# Patient Record
Sex: MALE | Race: WHITE | Employment: FULL TIME | URBAN - METROPOLITAN AREA
[De-identification: names, ages, dates, MRNs, and addresses within clinical notes are randomized per-mention and may not be internally consistent; named-entity substitution may affect disease eponyms.]

---

## 2017-06-15 NOTE — PROGRESS NOTES
SUBJECTIVE:     CC: Harvey Pro is an 31 year old male who presents for preventative health visit.     Healthy Habits:Answers for HPI/ROS submitted by the patient on 6/14/2017   Annual Exam:  Getting at least 3 servings of Calcium per day:: Yes  Bi-annual eye exam:: Yes  Dental care twice a year:: NO - no dental problems - but just made dental apt  Sleep apnea or symptoms of sleep apnea:: Daytime drowsiness - midday. Probably b/c not sleeping enough. Power nap at home after work and cold brew coffee helps. Only about 6.5 hours of sleep per night. 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  Diet:: Other  Frequency of exercise:: 4-5 days/week - weight lifting and some running  Taking medications regularly:: Not Applicable  Medication side effects:: Not applicable  Additional concerns today:: YES  PHQ-2 Score: 0  Duration of exercise:: 45-60 minutes    Harvey is doing really well  Working as Bearch at [employer]  Was up north in [place] with his girlfriend last weekend  Had some tendonitis R elbow from weight lifting that has since resolved.  Recurrent tinea versicolor - would like refill of ketoconazole shampoo that has worked well in the past.   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 He was told it could get worse but now is just a nuisance and he is willing to live with it as he currently rarely gets vertigo and doesn't want to have surgery if doesn't need it. He is relieved it isn't anything serious.    Today's PHQ-2 Score:   PHQ-2 ( 1999 Pf[employer]) 6/14/2017 11/30/2015   Q1: Little interest or pleasure in doing things 0 0   Q2: Feeling down, depressed or hopeless 0 0   PHQ-2 Score 0 0   Q1: Little interest or pleasure in doing things Not at all -   Q2: Feeling down, depressed or hopeless Not at all -   PHQ-2 Score 0 -       Abuse: Current or Past(Physical, Sexual or Emotional)- No  Do you feel safe in  "your environment - Yes    Social History   Substance Use Topics     Smoking status: Never Smoker     Smokeless tobacco: Never Used     Alcohol use 0.0 oz/week      Comment: Weekends     The patient does not drink >3 drinks per day nor >7 drinks per week.    Last PSA: No results found for: PSA    No results for input(s): CHOL, HDL, LDL, TRIG, CHOLHDLRATIO, NHDL in the last 90273 hours.    Reviewed orders with patient. Reviewed health maintenance and updated orders accordingly - Yes    ROS:  Comprehensive ROS negative unless as stated above in HPI.      OBJECTIVE:     /72 (BP Location: Right arm, Patient Position: Chair, Cuff Size: Adult Regular)  Pulse 75  Temp 98  F (36.7  C) (Oral)  Resp 16  Ht 5' 11.5\" (1.816 m)  Wt 166 lb (75.3 kg)  SpO2 99%  BMI 22.83 kg/m2  EXAM:  GENERAL: healthy, alert and no distress  EYES: Eyes grossly normal to inspection, PERRL and conjunctivae and sclerae normal  HENT: ear canals and TM's normal, nose and mouth without ulcers or lesions  NECK: no adenopathy, no asymmetry, masses, or scars and thyroid normal to palpation  RESP: lungs clear to auscultation - no rales, rhonchi or wheezes  CV: regular rate and rhythm, normal S1 S2, no S3 or S4, no murmur, click or rub, no peripheral edema   ABDOMEN: soft, nontender, no hepatosplenomegaly, no masses and bowel sounds normal  MS: no gross musculoskeletal defects noted, no edema  SKIN: faint varying pigmentation along left flank  NEURO: Normal strength and tone, mentation intact and speech normal  PSYCH: mentation appears normal, affect normal/bright    ASSESSMENT/PLAN:     1. Encounter for preventative adult health care exam with abnormal findings  Healthy alexandre  Declines fasting labs which is reasonable for his age  Will be going to dentist and also working on getting more sleep    2. Superior semicircular canal dehiscence, unspecified laterality  Monitoring    3. Tinea versicolor  - ketoconazole (NIZORAL) 2 % shampoo; The shampoo " "is applied to affected areas and is washed off after five minutes.  Dispense: 120 mL; Refill: 3    COUNSELING:  Reviewed preventive health counseling, as reflected in patient instructions       Regular exercise       Healthy diet/nutrition   reports that he has never smoked. He has never used smokeless tobacco.  Estimated body mass index is 22.83 kg/(m^2) as calculated from the following:    Height as of this encounter: 5' 11.5\" (1.816 m).    Weight as of this encounter: 166 lb (75.3 kg).       Patient Instructions   Let me know if your tinea isn't improving  Follow up as needed        Merly Ellis, DO  [facility]  "

## 2017-06-15 NOTE — PATIENT INSTRUCTIONS
Let me know if your tinea isn't improving  Follow up as needed    Preventive Health Recommendations  Male Ages 26 - 39    Yearly exam:             See your health care provider every year in order to  o   Review health changes.   o   Discuss preventive care.    o   Review your medicines if your doctor has prescribed any.    You should be tested each year for STDs (sexually transmitted diseases), if you re at risk.     After age 35, talk to your provider about cholesterol testing. If you are at risk for heart disease, have your cholesterol tested at least every 5 years.     If you are at risk for diabetes, you should have a diabetes test (fasting glucose).  Shots: Get a flu shot each year. Get a tetanus shot every 10 years.     Nutrition:    Eat at least 5 servings of fruits and vegetables daily.     Eat whole-grain bread, whole-wheat pasta and brown rice instead of white grains and rice.     Talk to your provider about Calcium and Vitamin D.     Lifestyle    Exercise for at least 150 minutes a week (30 minutes a day, 5 days a week). This will help you control your weight and prevent disease.     Limit alcohol to one drink per day.     No smoking.     Wear sunscreen to prevent skin cancer.     See your dentist every six months for an exam and cleaning.

## 2017-06-16 ENCOUNTER — OFFICE VISIT (OUTPATIENT)
Dept: FAMILY MEDICINE | Facility: CLINIC | Age: 32
End: 2017-06-16
Payer: COMMERCIAL

## 2017-06-16 VITALS
DIASTOLIC BLOOD PRESSURE: 72 MMHG | SYSTOLIC BLOOD PRESSURE: 124 MMHG | WEIGHT: 166 LBS | HEIGHT: 72 IN | HEART RATE: 75 BPM | BODY MASS INDEX: 22.48 KG/M2 | RESPIRATION RATE: 16 BRPM | OXYGEN SATURATION: 99 % | TEMPERATURE: 98 F

## 2017-06-16 DIAGNOSIS — H83.8X9 SUPERIOR SEMICIRCULAR CANAL DEHISCENCE, UNSPECIFIED LATERALITY: ICD-10-CM

## 2017-06-16 DIAGNOSIS — Z00.01 ENCOUNTER FOR PREVENTATIVE ADULT HEALTH CARE EXAM WITH ABNORMAL FINDINGS: Primary | ICD-10-CM

## 2017-06-16 DIAGNOSIS — B36.0 TINEA VERSICOLOR: ICD-10-CM

## 2017-06-16 PROCEDURE — 99395 PREV VISIT EST AGE 18-39: CPT | Performed by: INTERNAL MEDICINE

## 2017-06-16 RX ORDER — KETOCONAZOLE 20 MG/ML
SHAMPOO TOPICAL
Qty: 120 ML | Refills: 3 | Status: SHIPPED | OUTPATIENT
Start: 2017-06-16 | End: 2022-04-13

## 2017-06-16 NOTE — NURSING NOTE
"Chief Complaint   Patient presents with     Physical     Not Fasting     Elbow Pain     Right,  persisstant        Initial /72 (BP Location: Right arm, Patient Position: Chair, Cuff Size: Adult Regular)  Pulse 75  Temp 98  F (36.7  C) (Oral)  Resp 16  Ht 5' 11.5\" (1.816 m)  Wt 166 lb (75.3 kg)  SpO2 99%  BMI 22.83 kg/m2 Estimated body mass index is 22.83 kg/(m^2) as calculated from the following:    Height as of this encounter: 5' 11.5\" (1.816 m).    Weight as of this encounter: 166 lb (75.3 kg).  Medication Reconciliation: complete   Kamala Chong CMA (AAMA)      "

## 2017-06-16 NOTE — MR AVS SNAPSHOT
After Visit Summary   6/16/2017    Harvey Pro    MRN: [id number]           Patient Information     Date Of Birth          1985        Visit Information        Provider Department      6/16/2017 12:30 PM Merly Ellis,  [facility]        Today's Diagnoses     Encounter for preventative adult health care exam with abnormal findings    -  1    Superior semicircular canal dehiscence, unspecified laterality        Tinea versicolor          Care Instructions    Let me know if your tinea isn't improving  Follow up as needed    Preventive Health Recommendations  Male Ages 26 - 39    Yearly exam:             See your health care provider every year in order to  o   Review health changes.   o   Discuss preventive care.    o   Review your medicines if your doctor has prescribed any.    You should be tested each year for STDs (sexually transmitted diseases), if you re at risk.     After age 35, talk to your provider about cholesterol testing. If you are at risk for heart disease, have your cholesterol tested at least every 5 years.     If you are at risk for diabetes, you should have a diabetes test (fasting glucose).  Shots: Get a flu shot each year. Get a tetanus shot every 10 years.     Nutrition:    Eat at least 5 servings of fruits and vegetables daily.     Eat whole-grain bread, whole-wheat pasta and brown rice instead of white grains and rice.     Talk to your provider about Calcium and Vitamin D.     Lifestyle    Exercise for at least 150 minutes a week (30 minutes a day, 5 days a week). This will help you control your weight and prevent disease.     Limit alcohol to one drink per day.     No smoking.     Wear sunscreen to prevent skin cancer.     See your dentist every six months for an exam and cleaning.             Follow-ups after your visit        Who to contact     If you have questions or need follow up information about today's clinic visit or your schedule please  "contact [facility] directly at [phone].  Normal or non-critical lab and imaging results will be communicated to you by MyChart, letter or phone within 4 business days after the clinic has received the results. If you do not hear from us within 7 days, please contact the clinic through Oxagenhart or phone. If you have a critical or abnormal lab result, we will notify you by phone as soon as possible.  Submit refill requests through KarmaHire or call your pharmacy and they will forward the refill request to us. Please allow 3 business days for your refill to be completed.          Additional Information About Your Visit        OxagenharDroneDeploy Information     KarmaHire gives you secure access to your electronic health record. If you see a primary care provider, you can also send messages to your care team and make appointments. If you have questions, please call your primary care clinic.  If you do not have a primary care provider, please call [phone] and they will assist you.        Care EveryWhere ID     This is your Care EveryWhere ID. This could be used by other organizations to access your [facility] medical records  [id number]        Your Vitals Were     Pulse Temperature Respirations Height Pulse Oximetry BMI (Body Mass Index)    75 98  F (36.7  C) (Oral) 16 5' 11.5\" (1.816 m) 99% 22.83 kg/m2       Blood Pressure from Last 3 Encounters:   06/16/17 124/72   11/30/15 126/69    Weight from Last 3 Encounters:   06/16/17 166 lb (75.3 kg)   11/30/15 160 lb 9.6 oz (72.8 kg)              Today, you had the following     No orders found for display         Today's Medication Changes          These changes are accurate as of: 6/16/17 12:54 PM.  If you have any questions, ask your nurse or doctor.               Start taking these medicines.        Dose/Directions    ketoconazole 2 % shampoo   Commonly known as:  NIZORAL   Used for:  Tinea versicolor   Started by:  Merly Ellis, DO        The shampoo is applied " to affected areas and is washed off after five minutes.   Quantity:  120 mL   Refills:  3            Where to get your medicines      These medications were sent to [facility] - [place], [place] - [address], [address]  [address], [address], [facility] [address]     Phone:  [phone]     ketoconazole 2 % shampoo                Primary Care Provider Office Phone # Fax #    Merly Ellis,  [phone]       [facility] [address]  [facility] [address]        Thank you!     Thank you for choosing [facility]  for your care. Our goal is always to provide you with excellent care. Hearing back from our patients is one way we can continue to improve our services. Please take a few minutes to complete the written survey that you may receive in the mail after your visit with us. Thank you!             Your Updated Medication List - Protect others around you: Learn how to safely use, store and throw away your medicines at www.disposemymeds.org.          This list is accurate as of: 6/16/17 12:54 PM.  Always use your most recent med list.                   Brand Name Dispense Instructions for use    ketoconazole 2 % shampoo    NIZORAL    120 mL    The shampoo is applied to affected areas and is washed off after five minutes.

## 2017-12-02 ENCOUNTER — OFFICE VISIT (OUTPATIENT)
Dept: URGENT CARE | Facility: URGENT CARE | Age: 32
End: 2017-12-02
Payer: COMMERCIAL

## 2017-12-02 VITALS
TEMPERATURE: 98.7 F | HEART RATE: 105 BPM | OXYGEN SATURATION: 96 % | SYSTOLIC BLOOD PRESSURE: 119 MMHG | DIASTOLIC BLOOD PRESSURE: 67 MMHG

## 2017-12-02 DIAGNOSIS — S92.534A CLOSED NONDISPLACED FRACTURE OF DISTAL PHALANX OF LESSER TOE OF RIGHT FOOT, INITIAL ENCOUNTER: ICD-10-CM

## 2017-12-02 DIAGNOSIS — S92.424A CLOSED NONDISPLACED FRACTURE OF DISTAL PHALANX OF RIGHT GREAT TOE, INITIAL ENCOUNTER: Primary | ICD-10-CM

## 2017-12-02 PROCEDURE — 99214 OFFICE O/P EST MOD 30 MIN: CPT | Performed by: PHYSICIAN ASSISTANT

## 2017-12-02 ASSESSMENT — ENCOUNTER SYMPTOMS
DIARRHEA: 0
ABDOMINAL PAIN: 0
FEVER: 0
SHORTNESS OF BREATH: 0
FOCAL WEAKNESS: 0
HEADACHES: 0
NAUSEA: 0
VOMITING: 0
CHILLS: 0

## 2017-12-02 NOTE — NURSING NOTE
"Chief Complaint   Patient presents with     Urgent Care     Toe Injury     dropped a 50lb weight on right foot on thursday morning,toes changing color        Initial /67  Pulse 105  Temp 98.7  F (37.1  C) (Oral)  SpO2 96% Estimated body mass index is 22.83 kg/(m^2) as calculated from the following:    Height as of 6/16/17: 5' 11.5\" (1.816 m).    Weight as of 6/16/17: 166 lb (75.3 kg).  Medication Reconciliation: complete   Loni BRADY MA       "

## 2017-12-02 NOTE — PROGRESS NOTES
HPI  December 2, 2017    HPI: Harvey Pro is a 32 year old male who complains of moderate discoloration of R 2nd toe onset 2 days ago. While pt was at the gym 2 days ago a 50 lb weight rolled off a bench onto his R great & 2nd toes. 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 He is concerned b/c his 2nd toe is looking red & purple, but his big toe is not. Reports pain 3/10 when walking. Has been wrapping the toes. Symptoms are constant in duration. Denies fever/chills, warmth, numbness/tingling, poikilothermia, or pallor.     Past Medical History:   Diagnosis Date     Superior semicircular canal dehiscence      Tinea versicolor      Past Surgical History:   Procedure Laterality Date     HEAD & NECK SURGERY  August 2005    Correction of severe underbite     MANDIBLE SURGERY  2005    For underbite     Social History   Substance Use Topics     Smoking status: Never Smoker     Smokeless tobacco: Never Used     Alcohol use 0.0 oz/week      Comment: Weekends     Patient Active Problem List   Diagnosis     Tinea versicolor     Superior semicircular canal dehiscence, unspecified laterality     Family History   Problem Relation Age of Onset     MENTAL ILLNESS Other      Depression Mother      Depression Father         Problem list, Medication list, Allergies, and Medical/Social/Surgical histories reviewed in [facility] and updated as appropriate.      Review of Systems   Constitutional: Negative for chills and fever.   Respiratory: Negative for shortness of breath.    Cardiovascular: Negative for chest pain.   Gastrointestinal: Negative for abdominal pain, diarrhea, nausea and vomiting.   Musculoskeletal: Positive for joint pain.   Skin: Negative for rash.        Discoloration of toe   Neurological: Negative for focal weakness and headaches.   All other systems reviewed and are  negative.        Physical Exam   Constitutional: He is oriented to person, place, and time and well-developed, well-nourished, and in no distress.   HENT:   Head: Normocephalic and atraumatic.   Cardiovascular: Normal rate, regular rhythm and normal heart sounds.    Pulses:       Dorsalis pedis pulses are 2+ on the right side   Pulmonary/Chest: Effort normal and breath sounds normal.   Musculoskeletal: Normal range of motion.        Right foot: There is tenderness.        Feet:    Neurological: He is alert and oriented to person, place, and time. Gait normal.   Skin: Skin is warm and dry. Ecchymosis noted. No erythema.   Nursing note and vitals reviewed.    Vital Signs  /67  Pulse 105  Temp 98.7  F (37.1  C) (Oral)  SpO2 96%     Diagnostic Test Results:  none     ASSESSMENT/PLAN      ICD-10-CM    1. Closed nondisplaced fracture of distal phalanx of right great toe, initial encounter S92.424A    2. Closed nondisplaced fracture of distal phalanx of lesser toe of right foot, initial encounter S92.534A       Pt had a picture of his Xrays on his phone which confirmed fractures. Provided reassurance that discoloration was ecchymosis. Will not drain subungual hematoma as it has been 48 hrs since the injury. Recommended abhijit tape, ice, elevation, & rest. Pt declines crutches or Rx for pain medication.      I have discussed any lab or imaging results, the patient's diagnosis, and my plan of treatment with the patient and/or family. Patient is aware to come back in if with worsening symptoms or if no relief despite treatment plan.  Patient voiced understanding and had no further questions.       Follow Up: Return if symptoms worsen or fail to improve.    MADDIE Victor, PA-C  [facility]

## 2017-12-02 NOTE — MR AVS SNAPSHOT
After Visit Summary   12/2/2017    Harvey Pro    MRN: [id number]           Patient Information     Date Of Birth          1985        Visit Information        Provider Department      12/2/2017 10:25 AM Annmarie Garrison PA-C [facility]        Today's Diagnoses     Closed nondisplaced fracture of distal phalanx of right great toe, initial encounter    -  1    Closed nondisplaced fracture of distal phalanx of lesser toe of right foot, initial encounter           Follow-ups after your visit        Follow-up notes from your care team     Return if symptoms worsen or fail to improve.      Who to contact     If you have questions or need follow up information about today's clinic visit or your schedule please contact [facility] directly at [phone].  Normal or non-critical lab and imaging results will be communicated to you by MyChart, letter or phone within 4 business days after the clinic has received the results. If you do not hear from us within 7 days, please contact the clinic through MyChart or phone. If you have a critical or abnormal lab result, we will notify you by phone as soon as possible.  Submit refill requests through Melon or call your pharmacy and they will forward the refill request to us. Please allow 3 business days for your refill to be completed.          Additional Information About Your Visit        MyChart Information     Melon gives you secure access to your electronic health record. If you see a primary care provider, you can also send messages to your care team and make appointments. If you have questions, please call your primary care clinic.  If you do not have a primary care provider, please call [phone] and they will assist you.        Care EveryWhere ID     This is your Care EveryWhere ID. This could be used by other organizations to access your [facility] medical records  [id number]         Your Vitals Were     Pulse Temperature Pulse Oximetry             105 98.7  F (37.1  C) (Oral) 96%          Blood Pressure from Last 3 Encounters:   12/02/17 119/67   06/16/17 124/72   11/30/15 126/69    Weight from Last 3 Encounters:   06/16/17 166 lb (75.3 kg)   11/30/15 160 lb 9.6 oz (72.8 kg)              Today, you had the following     No orders found for display       Primary Care Provider Office Phone # Fax #    Merly Ellis,  [phone] [address]  [place] [address]        Equal Access to Services     [facility] : Hadii issa graves hadasho Soese, waaxda luqadaha, qaybta kaalmada adetammyyada, maximo clemons . So [facility] [phone].    ATENCIÓN: Si habla español, tiene a akers disposición servicios gratuitos de asistencia lingüística. Llame al [phone].    We comply with applicable federal civil rights laws and [place] laws. We do not discriminate on the basis of race, color, national origin, age, disability, sex, sexual orientation, or gender identity.            Thank you!     Thank you for choosing [facility]  for your care. Our goal is always to provide you with excellent care. Hearing back from our patients is one way we can continue to improve our services. Please take a few minutes to complete the written survey that you may receive in the mail after your visit with us. Thank you!             Your Updated Medication List - Protect others around you: Learn how to safely use, store and throw away your medicines at www.disposemymeds.org.          This list is accurate as of: 12/2/17 11:07 AM.  Always use your most recent med list.                   Brand Name Dispense Instructions for use Diagnosis    ketoconazole 2 % shampoo    NIZORAL    120 mL    The shampoo is applied to affected areas and is washed off after five minutes.    Tinea versicolor

## 2018-08-13 ASSESSMENT — PATIENT HEALTH QUESTIONNAIRE - PHQ9
SUM OF ALL RESPONSES TO PHQ QUESTIONS 1-9: 17
10. IF YOU CHECKED OFF ANY PROBLEMS, HOW DIFFICULT HAVE THESE PROBLEMS MADE IT FOR YOU TO DO YOUR WORK, TAKE CARE OF THINGS AT HOME, OR GET ALONG WITH OTHER PEOPLE: VERY DIFFICULT
SUM OF ALL RESPONSES TO PHQ QUESTIONS 1-9: 17

## 2018-08-14 ASSESSMENT — PATIENT HEALTH QUESTIONNAIRE - PHQ9: SUM OF ALL RESPONSES TO PHQ QUESTIONS 1-9: 17

## 2018-08-15 ENCOUNTER — OFFICE VISIT (OUTPATIENT)
Dept: FAMILY MEDICINE | Facility: CLINIC | Age: 33
End: 2018-08-15
Payer: COMMERCIAL

## 2018-08-15 VITALS
HEART RATE: 68 BPM | HEIGHT: 72 IN | BODY MASS INDEX: 21.4 KG/M2 | SYSTOLIC BLOOD PRESSURE: 113 MMHG | DIASTOLIC BLOOD PRESSURE: 56 MMHG | OXYGEN SATURATION: 96 % | WEIGHT: 158 LBS | TEMPERATURE: 97.6 F

## 2018-08-15 DIAGNOSIS — F41.9 ANXIETY: ICD-10-CM

## 2018-08-15 DIAGNOSIS — F42.9 OBSESSIVE-COMPULSIVE DISORDER, UNSPECIFIED TYPE: ICD-10-CM

## 2018-08-15 DIAGNOSIS — Z00.00 ROUTINE HISTORY AND PHYSICAL EXAMINATION OF ADULT: Primary | ICD-10-CM

## 2018-08-15 PROCEDURE — 99395 PREV VISIT EST AGE 18-39: CPT | Performed by: INTERNAL MEDICINE

## 2018-08-15 NOTE — PROGRESS NOTES
SUBJECTIVE:   CC: Harvey Pro is an 32 year old male who presents for preventative health visit.     Physical   Annual:     Getting at least 3 servings of Calcium per day:  Yes    Bi-annual eye exam:  NO    Dental care twice a year:  Yes    Sleep apnea or symptoms of sleep apnea:  Daytime drowsiness    Diet:  Regular (no restrictions)    Frequency of exercise:  2-3 days/week    Duration of exercise:  45-60 minutes    Taking medications regularly:  Not Applicable    Additional concerns today:  YES      Today's PHQ-2 Score:   PHQ-2 ( 1999 [employer]) 8/13/2018   Q1: Little interest or pleasure in doing things 2   Q2: Feeling down, depressed or hopeless 3   PHQ-2 Score 5   Q1: Little interest or pleasure in doing things More than half the days   Q2: Feeling down, depressed or hopeless Nearly every day   PHQ-2 Score 5       Abuse: Current or Past(Physical, Sexual or Emotional)- No  Do you feel safe in your environment - Yes    Social History   Substance Use Topics     Smoking status: Never Smoker     Smokeless tobacco: Never Used     Alcohol use 0.0 oz/week      Comment: Weekends     Alcohol Use 8/13/2018   If you drink alcohol do you typically have greater than 3 drinks per day OR greater than 7 drinks per week? No   No flowsheet data found.    Last PSA: No results found for: PSA    Reviewed orders with patient. Reviewed health maintenance and updated orders accordingly - Yes  Labs reviewed in EPIC    Reviewed and updated as needed this visit by clinical staff         Reviewed and updated as needed this visit by Provider        Past Medical History:   Diagnosis Date     Superior semicircular canal dehiscence      Tinea versicolor         Review of Systems  CONSTITUTIONAL: NEGATIVE for fever, chills, change in weight  INTEGUMENTARY/SKIN: NEGATIVE for worrisome rashes, moles or lesions  EYES: NEGATIVE for vision changes or irritation  ENT: NEGATIVE for ear, mouth and throat problems  RESP: NEGATIVE for significant  "cough or SOB  CV: NEGATIVE for chest pain, palpitations or peripheral edema  GI: NEGATIVE for nausea, abdominal pain, heartburn, or change in bowel habits   male: negative for dysuria, hematuria, decreased urinary stream, erectile dysfunction, urethral discharge  MUSCULOSKELETAL: NEGATIVE for significant arthralgias or myalgia  NEURO: NEGATIVE for weakness, dizziness or paresthesias  PSYCHIATRIC: POSITIVE for chronic anxiety and OCD symptoms. No suicidal ideations.    OBJECTIVE:   /56 (BP Location: Right arm, Cuff Size: Adult Regular)  Pulse 68  Temp 97.6  F (36.4  C) (Oral)  Ht 5' 11.8\" (1.824 m)  Wt 158 lb (71.7 kg)  SpO2 96%  BMI 21.55 kg/m2    Physical Exam  GENERAL: alert and no distress  EYES: Eyes grossly normal to inspection, PERRL and conjunctivae and sclerae normal  HENT: ear canals and TM's normal, nose and mouth without ulcers or lesions  NECK: no adenopathy, no asymmetry, masses, or scars and thyroid normal to palpation  RESP: lungs clear to auscultation - no rales, rhonchi or wheezes  CV: regular rate and rhythm, normal S1 S2, no S3 or S4, no murmur, click or rub, no peripheral edema and peripheral pulses strong  ABDOMEN: soft, nontender, no hepatosplenomegaly, no masses and bowel sounds normal  MS: no gross musculoskeletal defects noted, no edema  SKIN: no suspicious lesions or rashes  NEURO: Normal strength and tone, mentation intact and speech normal  PSYCH: mentation appears normal, affect normal/bright    Diagnostic Test Results:  Results for orders placed or performed in visit on 03/30/16   CT Temporal orbital sella w/o contrast    Narrative    CT TEMPORAL ORBITAL SELLA W/O CONTRAST 3/30/2016 7:50 AM    History: Dizziness and giddiness     Comparison: No prior similar studies     Technique: Using multidetector thin collimation helical acquisition  technique, axial and coronal thin section CT images were reconstructed  through both temporal bones. Additional reconstructions performed " in  the planes of Poschl and Stenver were also obtained. Images were  reviewed in a bone window.    Findings:   Right temporal bone: The mastoid air cells are clear. There is no  debris in the external auditory canal. The tympanic membrane is  intact. There is no fluid or soft tissue thickening in the right  middle ear cavity. The bony ossicles are intact and in normal  alignment. The epitympanum is clear. The bony scutum is sharp. The  covering the superior semicircular canal is very thin with a focal  area of complete dehiscence (image 25 of series 604), consistent with  borderline dehiscence. There is a vascular structure in the inner ear,  extending from the middle cranial fossa to the expected region of the  petrosal sinus, crossing under the arch of the superior semicircular  canal which could represent an enlarged petrosal vein. The facial  nerve canal appears normal along its course.    Left temporal bone: The mastoid air cells are clear. There is no  debris in the external auditory canal. The tympanic membrane is  intact. There is no fluid or soft tissue thickening in the left middle  ear cavity. The bony ossicles are intact and in normal alignment. The  epitympanum is clear. The bony scutum is sharp. There is dehiscence of  the superior semicircular canal. The facial nerve canal appears normal  along its course.      Impression    Impression:  1. Borderline dehiscence of the superior semicircular canal on the  right with overt dehiscence of the superior surgical canal and the  left.  2. Abnormal vascular structure in the right inner ear extending from  the middle cranial fossa to the expected region of the sigmoid sinus,  crossing under the arch of the superior semicircular canals which  could represent an enlarged petrosal vein.     I have personally reviewed the examination and initial interpretation  and I agree with the findings.    DIOGO ISABEL MD       ASSESSMENT/PLAN:   (Z00.00) Routine  "history and physical examination of adult  (primary encounter diagnosis)  Comment: davidesamaria physical exam today  Plan: patient declilned blood draws for labs today.    (F41.9) Anxiety  (F42.9) Obsessive-compulsive disorder, unspecified type  Comment: poorly controlled chronic anxiety and OCD  Plan: I have ordered MENTAL HEALTH REFERRAL  - Adult; Psychiatry and Medication Management; Psychiatry; [facility]: Psychiatry Clinic [phone]; We will contact you to schedule the appointment or please call with any questions.      COUNSELING:   Reviewed preventive health counseling, as reflected in patient instructions  Special attention given to:        Regular exercise       Healthy diet/nutrition    BP Readings from Last 1 Encounters:   12/02/17 119/67     Estimated body mass index is 22.83 kg/(m^2) as calculated from the following:    Height as of 6/16/17: 5' 11.5\" (1.816 m).    Weight as of 6/16/17: 166 lb (75.3 kg).           reports that he has never smoked. He has never used smokeless tobacco.      Counseling Resources:  ATP IV Guidelines  Pooled Cohorts Equation Calculator  FRAX Risk Assessment  ICSI Preventive Guidelines  Dietary Guidelines for Americans, 2010  USDA's MyPlate  ASA Prophylaxis  Lung CA Screening    Judy Fraser MD  [facility]  Answers for HPI/ROS submitted by the patient on 8/13/2018   PHQ-2 Score: 5  If you checked off any problems, how difficult have these problems made it for you to do your work, take care of things at home, or get along with other people?: Very difficult  PHQ9 TOTAL SCORE: 17    "

## 2018-08-15 NOTE — MR AVS SNAPSHOT
After Visit Summary   8/15/2018    Harvey Pro    MRN: [id number]           Patient Information     Date Of Birth          1985        Visit Information        Provider Department      8/15/2018 9:20 AM Judy Fraser MD [facility]        Today's Diagnoses     Routine history and physical examination of adult    -  1    Anxiety        Obsessive-compulsive disorder, unspecified type          Care Instructions      Preventive Health Recommendations  Male Ages 26 - 39    Yearly exam:             See your health care provider every year in order to  o   Review health changes.   o   Discuss preventive care.    o   Review your medicines if your doctor has prescribed any.    You should be tested each year for STDs (sexually transmitted diseases), if you re at risk.     After age 35, talk to your provider about cholesterol testing. If you are at risk for heart disease, have your cholesterol tested at least every 5 years.     If you are at risk for diabetes, you should have a diabetes test (fasting glucose).  Shots: Get a flu shot each year. Get a tetanus shot every 10 years.     Nutrition:    Eat at least 5 servings of fruits and vegetables daily.     Eat whole-grain bread, whole-wheat pasta and brown rice instead of white grains and rice.     Get adequate Calcium and Vitamin D.     Lifestyle    Exercise for at least 150 minutes a week (30 minutes a day, 5 days a week). This will help you control your weight and prevent disease.     Limit alcohol to one drink per day.     No smoking.     Wear sunscreen to prevent skin cancer.     See your dentist every six months for an exam and cleaning.             Follow-ups after your visit        Additional Services     MENTAL HEALTH REFERRAL  - Adult; Psychiatry and Medication Management; Psychiatry; [facility]: Psychiatry Clinic [phone]; We will contact you to schedule the appointment or please call with any questions       All scheduling  "is subject to the client's specific insurance plan & benefits, provider/location availability, and provider clinical specialities.  Please arrive 15 minutes early for your first appointment and bring your completed paperwork.    Please be aware that coverage of these services is subject to the terms and limitations of your health insurance plan.  Call member services at your health plan with any benefit or coverage questions.                            Who to contact     If you have questions or need follow up information about today's clinic visit or your schedule please contact [facility] directly at [phone].  Normal or non-critical lab and imaging results will be communicated to you by Numotehart, letter or phone within 4 business days after the clinic has received the results. If you do not hear from us within 7 days, please contact the clinic through AccurICt or phone. If you have a critical or abnormal lab result, we will notify you by phone as soon as possible.  Submit refill requests through Nefsis or call your pharmacy and they will forward the refill request to us. Please allow 3 business days for your refill to be completed.          Additional Information About Your Visit        Nefsis Information     Nefsis gives you secure access to your electronic health record. If you see a primary care provider, you can also send messages to your care team and make appointments. If you have questions, please call your primary care clinic.  If you do not have a primary care provider, please call [phone] and they will assist you.        Care EveryWhere ID     This is your Care EveryWhere ID. This could be used by other organizations to access your [place] medical records  [id number]        Your Vitals Were     Pulse Temperature Height Pulse Oximetry BMI (Body Mass Index)       68 97.6  F (36.4  C) (Oral) 5' 11.8\" (1.824 m) 96% 21.55 kg/m2        Blood Pressure from Last 3 Encounters:   08/15/18 " 113/56   12/02/17 119/67   06/16/17 124/72    Weight from Last 3 Encounters:   08/15/18 158 lb (71.7 kg)   06/16/17 166 lb (75.3 kg)   11/30/15 160 lb 9.6 oz (72.8 kg)              We Performed the Following     MENTAL HEALTH REFERRAL  - Adult; Psychiatry and Medication Management; Psychiatry; [facility]: Psychiatry Clinic [phone]; We will contact you to schedule the appointment or please call with any questions        Primary Care Provider Office Phone # Fax #    Merly Ellis,  [phone] [address]  [facility] [address]        Equal Access to Services     DAMIAN GOODWIN : Hadii issa Whitaker, waaxda maria teresaadaha, qaybta kaalmada juliana, maximo muniz. So [facility] [phone].    ATENCIÓN: Si habla español, tiene a akers disposición servicios gratuitos de asistencia lingüística. Llame al [phone].    We comply with applicable federal civil rights laws and [place] laws. We do not discriminate on the basis of race, color, national origin, age, disability, sex, sexual orientation, or gender identity.            Thank you!     Thank you for choosing [facility]  for your care. Our goal is always to provide you with excellent care. Hearing back from our patients is one way we can continue to improve our services. Please take a few minutes to complete the written survey that you may receive in the mail after your visit with us. Thank you!             Your Updated Medication List - Protect others around you: Learn how to safely use, store and throw away your medicines at www.disposemymeds.org.          This list is accurate as of 8/15/18  9:32 AM.  Always use your most recent med list.                   Brand Name Dispense Instructions for use Diagnosis    ketoconazole 2 % shampoo    NIZORAL    120 mL    The shampoo is applied to affected areas and is washed off after five minutes.    Tinea versicolor

## 2018-08-31 ENCOUNTER — TELEPHONE (OUTPATIENT)
Dept: PSYCHIATRY | Facility: CLINIC | Age: 33
End: 2018-08-31

## 2018-08-31 NOTE — TELEPHONE ENCOUNTER
PSYCHIATRY CLINIC PHONE INTAKE     SERVICES REQUESTED / INTERESTED IN          Med Management and possibly talk terapy    Presenting Problem and Brief History                              What would you like to be seen for? (brief description):  Pt wasn't diagnosed with OCD but has obsessive compulsiveness related to his anxiety. Obsessive rumination over slight details causing anxiety and occasional panic attacks. Panic attacks happen one or twice a week. Sleep is affected by this. He gets about 4-6 hours a night. During work, the only time he's not ruminating is when he's seeing pts and between pt's, its affecting his concentration. Rumination can flare up when he's with friends and family, feels like he is pretending with them. When he gets by himself he feels a relieve, feeling able to be himself. He isn't not noticing sudden mood changes. He has a lack of interest in music but lately hasn't been interested in it. He feels worse when he is isolated with his rumination. It's nice to be distracted for a little while. He is not taking any other medications right now except the prescribed shampoo noted on his chart.   Have you received a mental health diagnosis? Yes   Which one (s): Depression and Anxiety causing sleeplessness to obsessive compulsiveness,   Is there any history of developmental delay?  No   Are you currently seeing a mental health provider?  Yes            Who / month last seen:  Janelle Healy, therapist in [place], last seen a week and a half ago.   Do you have mental health records elsewhere?  Yes  Will you sign a release so we can obtain them?  Yes    Have you ever been hospitalized for psychiatric reasons?  No  Describe:  NA    Do you have current thoughts of self-harm?  No  Do you currently have thoughts of harming others?  No       Substance Use History     Do you have any history of alcohol / illicit drug use?  No  Describe:  NA   Have you ever received treatment for this?  No    Describe:   NA     Social History     Does the patient have a guardian?  No    Name / number: NA  Have you had an ACT team in last 12 months?  No  Describe: NA   Do you have any current or past legal issues?  No  Describe: NA   OK to leave a detailed voicemail?  Yes    Medical/ Surgical History                                   Patient Active Problem List   Diagnosis     Tinea versicolor     Superior semicircular canal dehiscence, unspecified laterality          Medications             Current Outpatient Prescriptions   Medication Sig Dispense Refill     ketoconazole (NIZORAL) 2 % shampoo The shampoo is applied to affected areas and is washed off after five minutes. 120 mL 3         DISPOSITION      8/31/18 Intake completed. Prefers male provider, but not required. Ready for review. Amanda Haynes   9/21/18 Pt is ok to schedule AGE w/ NP or resident. ALEX

## 2020-02-17 ENCOUNTER — HEALTH MAINTENANCE LETTER (OUTPATIENT)
Age: 35
End: 2020-02-17

## 2020-09-03 ENCOUNTER — VIRTUAL VISIT (OUTPATIENT)
Dept: FAMILY MEDICINE | Facility: CLINIC | Age: 35
End: 2020-09-03

## 2020-09-03 DIAGNOSIS — F41.9 ANXIETY: Primary | ICD-10-CM

## 2020-09-03 DIAGNOSIS — F32.A DEPRESSION, UNSPECIFIED DEPRESSION TYPE: ICD-10-CM

## 2020-09-03 PROCEDURE — 99214 OFFICE O/P EST MOD 30 MIN: CPT | Mod: 95 | Performed by: INTERNAL MEDICINE

## 2020-09-03 RX ORDER — SERTRALINE HYDROCHLORIDE 100 MG/1
100 TABLET, FILM COATED ORAL DAILY
Qty: 90 TABLET | Refills: 3 | Status: SHIPPED | OUTPATIENT
Start: 2020-09-03 | End: 2022-04-13

## 2020-09-03 ASSESSMENT — PATIENT HEALTH QUESTIONNAIRE - PHQ9
5. POOR APPETITE OR OVEREATING: MORE THAN HALF THE DAYS
SUM OF ALL RESPONSES TO PHQ QUESTIONS 1-9: 15

## 2020-09-03 ASSESSMENT — ANXIETY QUESTIONNAIRES
2. NOT BEING ABLE TO STOP OR CONTROL WORRYING: NEARLY EVERY DAY
6. BECOMING EASILY ANNOYED OR IRRITABLE: SEVERAL DAYS
5. BEING SO RESTLESS THAT IT IS HARD TO SIT STILL: NOT AT ALL
IF YOU CHECKED OFF ANY PROBLEMS ON THIS QUESTIONNAIRE, HOW DIFFICULT HAVE THESE PROBLEMS MADE IT FOR YOU TO DO YOUR WORK, TAKE CARE OF THINGS AT HOME, OR GET ALONG WITH OTHER PEOPLE: SOMEWHAT DIFFICULT
7. FEELING AFRAID AS IF SOMETHING AWFUL MIGHT HAPPEN: MORE THAN HALF THE DAYS
3. WORRYING TOO MUCH ABOUT DIFFERENT THINGS: MORE THAN HALF THE DAYS
1. FEELING NERVOUS, ANXIOUS, OR ON EDGE: NEARLY EVERY DAY
GAD7 TOTAL SCORE: 13

## 2020-09-03 NOTE — PROGRESS NOTES
"Harvey Pro is a 35 year old male  who is being evaluated via a billable telephone visit.      The patient has been notified of following:     \"This telephone visit will be conducted via a call between you and your physician/provider. We have found that certain health care needs can be provided without the need for a physical exam.  This service lets us provide the care you need with a short phone conversation.  If a prescription is necessary we can send it directly to your pharmacy.  If lab work is needed we can place an order for that and you can then stop by our lab to have the test done at a later time.    Telephone visits are billed at different rates depending on your insurance coverage. During this emergency period, for some insurers they may be billed the same as an in-person visit.  Please reach out to your insurance provider with any questions.    If during the course of the call the physician/provider feels a telephone visit is not appropriate, you will not be charged for this service.\"    Patient has given verbal consent for Telephone visit?  Yes    What phone number would you like to be contacted at?  [phone]    How would you like to obtain your AVS? Mail a copy    Subjective     Harvey Pro is a 35 year old male who presents today via telephone visit for the following health issues:    HPI      Chief Complaint   Patient presents with     Anxiety     discuss meds.  Positive question #9 on PHQ-9          HPI:   Patient Harvey Pro is a very pleasant 35 year old male with history of depression, anxiety today for telephone visit for evaluation of poorly controlled chronic depression, anxiety symptoms. Regarding the patient's depression with anxiety, the patient denies any acute suicidal ideations at this time. He is not on any depression, anxiety medication at this time. He is interested in a trial of antidepressant medication at this time. He is also interested in a " mental health clinic referral for further evaluation going forward. No chest pain, headaches, fever or chills at this time.       Current Medications:     Current Outpatient Medications   Medication Sig Dispense Refill     sertraline (ZOLOFT) 100 MG tablet Take 1 tablet (100 mg) by mouth daily 90 tablet 3     ketoconazole (NIZORAL) 2 % shampoo The shampoo is applied to affected areas and is washed off after five minutes. (Patient not taking: Reported on 9/3/2020) 120 mL 3         Allergies:      Allergies   Allergen Reactions     No Known Allergies             Past Medical History:     Past Medical History:   Diagnosis Date     Superior semicircular canal dehiscence      Tinea versicolor          Past Surgical History:     Past Surgical History:   Procedure Laterality Date     HEAD & NECK SURGERY  August 2005    Correction of severe underbite     MANDIBLE SURGERY  2005    For underbite         Family Medical History:     Family History   Problem Relation Age of Onset     Mental Illness Other      Depression Mother      Depression Father          Social History:     Social History     Socioeconomic History     Marital status: Single     Spouse name: Not on file     Number of children: Not on file     Years of education: Not on file     Highest education level: Not on file   Occupational History     Not on file   Social Needs     Financial resource strain: Not on file     Food insecurity     Worry: Not on file     Inability: Not on file     Transportation needs     Medical: Not on file     Non-medical: Not on file   Tobacco Use     Smoking status: Never Smoker     Smokeless tobacco: Never Used   Substance and Sexual Activity     Alcohol use: Yes     Alcohol/week: 0.0 standard drinks     Comment: 1 drink a week     Drug use: No     Sexual activity: Yes     Partners: Female     Birth control/protection: Condom   Lifestyle     Physical activity     Days per week: Not on file     Minutes per session: Not on file      Stress: Not on file   Relationships     Social connections     Talks on phone: Not on file     Gets together: Not on file     Attends Caodaism service: Not on file     Active member of club or organization: Not on file     Attends meetings of clubs or organizations: Not on file     Relationship status: Not on file     Intimate partner violence     Fear of current or ex partner: Not on file     Emotionally abused: Not on file     Physically abused: Not on file     Forced sexual activity: Not on file   Other Topics Concern     Parent/sibling w/ CABG, MI or angioplasty before 65F 55M? Not Asked   Social History Narrative     Not on file           Review of System:     Constitutional: Negative for fever or chills  Skin: Negative for rashes  Ears/Nose/Throat: Negative for nasal congestion, sore throat  Respiratory: No shortness of breath, dyspnea on exertion, cough, or hemoptysis  Cardiovascular: Negative for chest pain  Gastrointestinal: Negative for nausea, vomiting  Genitourinary: Negative for dysuria, hematuria  Musculoskeletal: Negative for myalgias  Neurologic: Negative for headaches  Psychiatric: positive for depression, anxiety  Hematologic/Lymphatic/Immunologic: Negative  Endocrine: Negative  Behavioral: Negative for tobacco use       Physical Exam:   There were no vitals taken for this visit.    RESP: no cough over the phone   NEURO: patient sounds Alert & Oriented over the phone   PSYCH: mentation appears normal over the phone, patient sounds anxious over the phone.        Diagnostic Test Results:     Diagnostic Test Results:  Labs reviewed in Epic    ASSESSMENT/PLAN:       (F32.9) Depression, unspecified depression type  (F41.9) Anxiety  (primary encounter diagnosis)  Comment: poorly controlled chronic depression, anxiety symptoms. Regarding the patient's depression with anxiety, the patient denies any acute suicidal ideations at this time. He is not on any depression, anxiety medication at this time. He is  interested in a trial of antidepressant medication at this time. He is also interested in a mental health clinic referral for further evaluation going forward  Plan: sertraline (ZOLOFT) 100 MG tablet, MENTAL         HEALTH REFERRAL  - Adult; Psychiatry;         Psychiatry; [facility]: Psychiatry Clinic [phone]; We will contact you to schedule the         appointment or please call with any questions,         MENTAL HEALTH REFERRAL  - Adult; Psychiatry;         Psychiatry; G: Collaborative Care Psychiatry         Service/Bridge to Long-Term Psychiatry as         indicated ([phone]); Yes; Chronic Mental        Health without improvement;     Follow Up Plan:     Patient is instructed to return to Internal Medicine clinic for follow-up visit in 1 month.        Judy Fraser MD  Internal Medicine  [facility]        Phone call duration:  25 minutes

## 2020-09-04 ASSESSMENT — ANXIETY QUESTIONNAIRES: GAD7 TOTAL SCORE: 13

## 2020-11-29 ENCOUNTER — HEALTH MAINTENANCE LETTER (OUTPATIENT)
Age: 35
End: 2020-11-29

## 2021-04-10 ENCOUNTER — HEALTH MAINTENANCE LETTER (OUTPATIENT)
Age: 36
End: 2021-04-10

## 2021-09-25 ENCOUNTER — HEALTH MAINTENANCE LETTER (OUTPATIENT)
Age: 36
End: 2021-09-25

## 2022-01-20 ENCOUNTER — LAB REQUISITION (OUTPATIENT)
Dept: LAB | Facility: CLINIC | Age: 37
End: 2022-01-20

## 2022-01-20 LAB — SARS-COV-2 RNA RESP QL NAA+PROBE: NEGATIVE

## 2022-01-20 PROCEDURE — U0003 INFECTIOUS AGENT DETECTION BY NUCLEIC ACID (DNA OR RNA); SEVERE ACUTE RESPIRATORY SYNDROME CORONAVIRUS 2 (SARS-COV-2) (CORONAVIRUS DISEASE [COVID-19]), AMPLIFIED PROBE TECHNIQUE, MAKING USE OF HIGH THROUGHPUT TECHNOLOGIES AS DESCRIBED BY CMS-2020-01-R: HCPCS | Performed by: INTERNAL MEDICINE

## 2022-04-13 ENCOUNTER — OFFICE VISIT (OUTPATIENT)
Dept: FAMILY MEDICINE | Facility: CLINIC | Age: 37
End: 2022-04-13
Payer: COMMERCIAL

## 2022-04-13 VITALS
BODY MASS INDEX: 22.89 KG/M2 | HEIGHT: 72 IN | WEIGHT: 169 LBS | HEART RATE: 83 BPM | SYSTOLIC BLOOD PRESSURE: 120 MMHG | OXYGEN SATURATION: 95 % | DIASTOLIC BLOOD PRESSURE: 64 MMHG | TEMPERATURE: 97.5 F

## 2022-04-13 DIAGNOSIS — R07.89 OTHER CHEST PAIN: ICD-10-CM

## 2022-04-13 DIAGNOSIS — Z13.6 SCREENING FOR CARDIOVASCULAR CONDITION: ICD-10-CM

## 2022-04-13 DIAGNOSIS — Z13.1 SCREENING FOR DIABETES MELLITUS: ICD-10-CM

## 2022-04-13 DIAGNOSIS — Z00.00 ROUTINE GENERAL MEDICAL EXAMINATION AT A HEALTH CARE FACILITY: Primary | ICD-10-CM

## 2022-04-13 DIAGNOSIS — F41.1 GAD (GENERALIZED ANXIETY DISORDER): ICD-10-CM

## 2022-04-13 LAB
CHOLEST SERPL-MCNC: 219 MG/DL
FASTING STATUS PATIENT QL REPORTED: NO
HBA1C MFR BLD: 5.2 % (ref 0–5.6)
HDLC SERPL-MCNC: 54 MG/DL
LDLC SERPL CALC-MCNC: 149 MG/DL
NONHDLC SERPL-MCNC: 165 MG/DL
TRIGL SERPL-MCNC: 79 MG/DL

## 2022-04-13 PROCEDURE — 80061 LIPID PANEL: CPT | Performed by: PHYSICIAN ASSISTANT

## 2022-04-13 PROCEDURE — 36415 COLL VENOUS BLD VENIPUNCTURE: CPT | Performed by: PHYSICIAN ASSISTANT

## 2022-04-13 PROCEDURE — 99214 OFFICE O/P EST MOD 30 MIN: CPT | Mod: 25 | Performed by: PHYSICIAN ASSISTANT

## 2022-04-13 PROCEDURE — 83036 HEMOGLOBIN GLYCOSYLATED A1C: CPT | Performed by: PHYSICIAN ASSISTANT

## 2022-04-13 PROCEDURE — 99395 PREV VISIT EST AGE 18-39: CPT | Performed by: PHYSICIAN ASSISTANT

## 2022-04-13 RX ORDER — FLUTICASONE PROPIONATE 50 MCG
1 SPRAY, SUSPENSION (ML) NASAL DAILY
COMMUNITY

## 2022-04-13 RX ORDER — ESCITALOPRAM OXALATE 5 MG/1
5 TABLET ORAL DAILY
Qty: 30 TABLET | Refills: 1 | Status: SHIPPED | OUTPATIENT
Start: 2022-04-13 | End: 2022-06-20

## 2022-04-13 ASSESSMENT — ANXIETY QUESTIONNAIRES
2. NOT BEING ABLE TO STOP OR CONTROL WORRYING: NEARLY EVERY DAY
6. BECOMING EASILY ANNOYED OR IRRITABLE: SEVERAL DAYS
7. FEELING AFRAID AS IF SOMETHING AWFUL MIGHT HAPPEN: SEVERAL DAYS
5. BEING SO RESTLESS THAT IT IS HARD TO SIT STILL: NOT AT ALL
1. FEELING NERVOUS, ANXIOUS, OR ON EDGE: MORE THAN HALF THE DAYS
GAD7 TOTAL SCORE: 11
3. WORRYING TOO MUCH ABOUT DIFFERENT THINGS: MORE THAN HALF THE DAYS
IF YOU CHECKED OFF ANY PROBLEMS ON THIS QUESTIONNAIRE, HOW DIFFICULT HAVE THESE PROBLEMS MADE IT FOR YOU TO DO YOUR WORK, TAKE CARE OF THINGS AT HOME, OR GET ALONG WITH OTHER PEOPLE: SOMEWHAT DIFFICULT

## 2022-04-13 ASSESSMENT — PAIN SCALES - GENERAL: PAINLEVEL: NO PAIN (0)

## 2022-04-13 ASSESSMENT — PATIENT HEALTH QUESTIONNAIRE - PHQ9
5. POOR APPETITE OR OVEREATING: MORE THAN HALF THE DAYS
SUM OF ALL RESPONSES TO PHQ QUESTIONS 1-9: 5

## 2022-04-13 NOTE — PROGRESS NOTES
"SUBJECTIVE:   CC: Harvey Pro is an 36 year old male who presents for preventative health visit.     Patient has been advised of split billing requirements and indicates understanding: Yes  Healthy Habits:    Getting at least 3 servings of Calcium per day:  Yes    Bi-annual eye exam:  Yes    Dental care twice a year:  Yes    Sleep apnea or symptoms of sleep apnea:  None    Diet:: No beef.    Frequency of exercise:: 1-2 times per week.    Duration of exercise:  45-60 minutes    Taking medications regularly:  No    Barriers to taking medications:  Not applicable    PHQ-2 Total Score:      Chest Pain      Onset: x2-3 months    Description (location/character/radiation/duration): \"mild discomfort\" in Lt chest    Intensity:  mild    Accompanying signs and symptoms:        Shortness of breath: YES- occasionally, when walking through airport, noted some \"heaviness\"       Sweating: no        Nausea/vomitting: no        Palpitations: no        Other (fevers/chills/cough/heartburn/lightheadedness): no    History (similar episodes/previous evaluation): None    Precipitating or alleviating factors:       Worse with exertion: no        Worse with breathing: no        Related to eating: YES- sometimes when drinking caffeine, but not always       Better with burping: no     Therapies tried and outcome: None    Anxiety Follow-Up    How are you doing with your anxiety since your last visit? stable    Are you having other symptoms that might be associated with anxiety? No    Have you had a significant life event? No     Are you feeling depressed? No    Do you have any concerns with your use of alcohol or other drugs? No     - Patient started on sertraline 1-2 years ago for RADHA. Developed worsening anxiety and suicidal ideation so discontinued within 1 week. Sees a therapist regularly, manages anxiety with techniques learned there but therapist suggested trial of medication again.    Social History     Tobacco Use     Smoking " status: Never Smoker     Smokeless tobacco: Never Used   Substance Use Topics     Alcohol use: Yes     Alcohol/week: 0.0 standard drinks     Comment: 1 drink a week     Drug use: No     RADHA-7 SCORE 9/3/2020 4/13/2022   Total Score 13 11     PHQ 8/13/2018 9/3/2020 4/13/2022   PHQ-9 Total Score 17 15 5   Q9: Thoughts of better off dead/self-harm past 2 weeks More than half the days More than half the days Not at all     Today's PHQ-2 Score:   PHQ-2 ( 1999 Pfizer) 9/3/2020   Q1: Little interest or pleasure in doing things 2   Q2: Feeling down, depressed or hopeless 1   PHQ-2 Score 3   PHQ-2 Total Score (12-17 Years)- Positive if 3 or more points; Administer PHQ-A if positive 3   Q1: Little interest or pleasure in doing things -   Q2: Feeling down, depressed or hopeless -   PHQ-2 Score -       Abuse: Current or Past(Physical, Sexual or Emotional)- No  Do you feel safe in your environment? Yes    Have you ever done Advance Care Planning? (For example, a Health Directive, POLST, or a discussion with a medical provider or your loved ones about your wishes): No, advance care planning information given to patient to review.  Patient declined advance care planning discussion at this time.    Social History     Tobacco Use     Smoking status: Never Smoker     Smokeless tobacco: Never Used   Substance Use Topics     Alcohol use: Yes     Alcohol/week: 0.0 standard drinks     Comment: 1 drink a week       Last PSA: No results found for: PSA    Reviewed orders with patient. Reviewed health maintenance and updated orders accordingly - Yes  BP Readings from Last 3 Encounters:   04/13/22 120/64   08/15/18 113/56   12/02/17 119/67    Wt Readings from Last 3 Encounters:   04/13/22 76.7 kg (169 lb)   08/15/18 71.7 kg (158 lb)   06/16/17 75.3 kg (166 lb)            Patient Active Problem List   Diagnosis     Superior semicircular canal dehiscence, unspecified laterality     RADHA (generalized anxiety disorder)     Past Surgical History:  "  Procedure Laterality Date     HEAD & NECK SURGERY  August 2005    Correction of severe underbite     MANDIBLE SURGERY  2005    For underbite       Social History     Tobacco Use     Smoking status: Never Smoker     Smokeless tobacco: Never Used   Substance Use Topics     Alcohol use: Yes     Alcohol/week: 0.0 standard drinks     Comment: 1 drink a week     Family History   Problem Relation Age of Onset     Mental Illness Other      Depression Mother      Depression Father            Reviewed and updated as needed this visit by clinical staff   Tobacco  Allergies  Meds  Problems  Med Hx  Surg Hx  Fam Hx  Soc   Hx          Reviewed and updated as needed this visit by Provider   Tobacco  Allergies  Meds  Problems  Med Hx  Surg Hx  Fam Hx               Review of Systems  CONSTITUTIONAL: NEGATIVE for fever, chills, change in weight  INTEGUMENTARY/SKIN: NEGATIVE for worrisome rashes, moles or lesions  EYES: NEGATIVE for vision changes or irritation  ENT: NEGATIVE for ear, mouth and throat problems  RESP: NEGATIVE for significant cough or SOB  CV: NEGATIVE for chest pain, palpitations or peripheral edema  GI: NEGATIVE for nausea, abdominal pain, heartburn, or change in bowel habits   male: negative for dysuria, hematuria, decreased urinary stream, erectile dysfunction, urethral discharge  MUSCULOSKELETAL: NEGATIVE for significant arthralgias or myalgia  NEURO: NEGATIVE for weakness, dizziness or paresthesias  PSYCHIATRIC: as above    OBJECTIVE:   /64   Pulse 83   Temp 97.5  F (36.4  C) (Tympanic)   Ht 1.82 m (5' 11.65\")   Wt 76.7 kg (169 lb)   SpO2 95%   BMI 23.14 kg/m      Physical Exam  GENERAL: healthy, alert and no distress  EYES: Eyes grossly normal to inspection, PERRL and conjunctivae and sclerae normal  HENT: ear canals and TM's normal, nose and mouth without ulcers or lesions  NECK: no adenopathy, no asymmetry, masses, or scars and thyroid normal to palpation  RESP: lungs clear to " "auscultation - no rales, rhonchi or wheezes  CV: regular rate and rhythm, normal S1 S2, no S3 or S4, no murmur, click or rub, no peripheral edema and peripheral pulses strong  ABDOMEN: soft, nontender, no hepatosplenomegaly, no masses and bowel sounds normal  MS: no gross musculoskeletal defects noted, no edema  SKIN: no suspicious lesions or rashes  NEURO: Normal strength and tone, mentation intact and speech normal  PSYCH: Mental Status Exam  Behavior: cooperative and pleasant  Speech: normal rate and rhythm  Mood: anxious  Affect: Euthymic  Thought Processes: Logical and Linear  Thought Content: denies suicidal ideation, intent or thoughts  Insight: Good  Judgment: Adequate for safety      ASSESSMENT/PLAN:       ICD-10-CM    1. Routine general medical examination at a health care facility  Z00.00    2. RADHA (generalized anxiety disorder)  F41.1 escitalopram (LEXAPRO) 5 MG tablet   3. Other chest pain  R07.89    4. Screening for cardiovascular condition  Z13.6 Lipid panel reflex to direct LDL Non-fasting     Lipid panel reflex to direct LDL Non-fasting   5. Screening for diabetes mellitus  Z13.1 Hemoglobin A1c     Hemoglobin A1c     - Given severe SE from sertraline, will trial escitalopram to see if he tolerates this better. Will start at 5 mg dose. Reviewed need to titrate dose based on response; send My Chart in 3-4 weeks to update me on how dose is working. Reviewed potential SE, notify me of anything severe.  - Unclear etiology of CP, reassured patient of benign exam. Suspect MSK vs GI etiology; mild in nature, likely benign. Discussed symptoms that warrant recheck.    COUNSELING:   Reviewed preventive health counseling, as reflected in patient instructions    Estimated body mass index is 23.14 kg/m  as calculated from the following:    Height as of this encounter: 1.82 m (5' 11.65\").    Weight as of this encounter: 76.7 kg (169 lb).         He reports that he has never smoked. He has never used smokeless " tobacco.      Counseling Resources:  ATP IV Guidelines  Pooled Cohorts Equation Calculator  FRAX Risk Assessment  ICSI Preventive Guidelines  Dietary Guidelines for Americans, 2010  USDA's MyPlate  ASA Prophylaxis  Lung CA Screening    LISA Ruiz [facility]

## 2022-04-14 ASSESSMENT — ANXIETY QUESTIONNAIRES: GAD7 TOTAL SCORE: 11

## 2022-06-18 DIAGNOSIS — F41.1 GAD (GENERALIZED ANXIETY DISORDER): ICD-10-CM

## 2022-06-18 NOTE — TELEPHONE ENCOUNTER
Reason for Call:  Medication or medication refill:    Do you use a [facility]?  Name of the pharmacy and phone number for the current request:  [facility] [address] [phone]    Name of the medication requested: escitalopram (LEXAPRO) 5 MG tablet    Other request: n/a    Can we leave a detailed message on this number? YES    Phone number patient can be reached at: Home number on file [phone] (home)    Best Time: Anytime    Call taken on 6/18/2022 at 6:32 AM by Gracia Mccall

## 2022-06-20 RX ORDER — ESCITALOPRAM OXALATE 5 MG/1
5 TABLET ORAL DAILY
Qty: 30 TABLET | Refills: 1 | Status: SHIPPED | OUTPATIENT
Start: 2022-06-20 | End: 2022-08-19

## 2022-07-21 ENCOUNTER — LAB REQUISITION (OUTPATIENT)
Dept: LAB | Facility: CLINIC | Age: 37
End: 2022-07-21

## 2022-07-21 LAB — SARS-COV-2 RNA RESP QL NAA+PROBE: NEGATIVE

## 2022-07-21 PROCEDURE — U0003 INFECTIOUS AGENT DETECTION BY NUCLEIC ACID (DNA OR RNA); SEVERE ACUTE RESPIRATORY SYNDROME CORONAVIRUS 2 (SARS-COV-2) (CORONAVIRUS DISEASE [COVID-19]), AMPLIFIED PROBE TECHNIQUE, MAKING USE OF HIGH THROUGHPUT TECHNOLOGIES AS DESCRIBED BY CMS-2020-01-R: HCPCS | Performed by: INTERNAL MEDICINE

## 2022-08-18 DIAGNOSIS — F41.1 GAD (GENERALIZED ANXIETY DISORDER): ICD-10-CM

## 2022-08-19 RX ORDER — ESCITALOPRAM OXALATE 5 MG/1
TABLET ORAL
Qty: 90 TABLET | Refills: 2 | Status: SHIPPED | OUTPATIENT
Start: 2022-08-19 | End: 2023-04-05

## 2022-08-19 NOTE — TELEPHONE ENCOUNTER
Prescription approved per [facility] Refill Protocol.    Yolanda Crandall RN  [facility] Triage Team

## 2022-10-24 ENCOUNTER — LAB REQUISITION (OUTPATIENT)
Dept: LAB | Facility: CLINIC | Age: 37
End: 2022-10-24

## 2022-10-24 LAB — SARS-COV-2 RNA RESP QL NAA+PROBE: POSITIVE

## 2022-10-24 PROCEDURE — U0003 INFECTIOUS AGENT DETECTION BY NUCLEIC ACID (DNA OR RNA); SEVERE ACUTE RESPIRATORY SYNDROME CORONAVIRUS 2 (SARS-COV-2) (CORONAVIRUS DISEASE [COVID-19]), AMPLIFIED PROBE TECHNIQUE, MAKING USE OF HIGH THROUGHPUT TECHNOLOGIES AS DESCRIBED BY CMS-2020-01-R: HCPCS | Performed by: INTERNAL MEDICINE

## 2022-12-26 ENCOUNTER — HEALTH MAINTENANCE LETTER (OUTPATIENT)
Age: 37
End: 2022-12-26

## 2023-04-03 ASSESSMENT — ENCOUNTER SYMPTOMS
CHILLS: 0
CONSTIPATION: 0
HEADACHES: 0
HEMATOCHEZIA: 0
DIARRHEA: 0
ARTHRALGIAS: 0
FEVER: 0
PARESTHESIAS: 0
FREQUENCY: 0
MYALGIAS: 0
WEAKNESS: 0
DIZZINESS: 0
SHORTNESS OF BREATH: 0
EYE PAIN: 0
HEMATURIA: 0
DYSURIA: 0
NAUSEA: 0
SORE THROAT: 0
JOINT SWELLING: 0
HEARTBURN: 0
COUGH: 0
PALPITATIONS: 0
NERVOUS/ANXIOUS: 0
ABDOMINAL PAIN: 0

## 2023-04-05 ENCOUNTER — OFFICE VISIT (OUTPATIENT)
Dept: FAMILY MEDICINE | Facility: CLINIC | Age: 38
End: 2023-04-05
Payer: COMMERCIAL

## 2023-04-05 VITALS
RESPIRATION RATE: 15 BRPM | SYSTOLIC BLOOD PRESSURE: 118 MMHG | WEIGHT: 156 LBS | HEART RATE: 72 BPM | HEIGHT: 72 IN | DIASTOLIC BLOOD PRESSURE: 69 MMHG | TEMPERATURE: 96.9 F | BODY MASS INDEX: 21.13 KG/M2 | OXYGEN SATURATION: 98 %

## 2023-04-05 DIAGNOSIS — Z13.1 SCREENING FOR DIABETES MELLITUS: ICD-10-CM

## 2023-04-05 DIAGNOSIS — Z00.00 ROUTINE GENERAL MEDICAL EXAMINATION AT A HEALTH CARE FACILITY: Primary | ICD-10-CM

## 2023-04-05 DIAGNOSIS — Z13.6 SCREENING FOR CARDIOVASCULAR CONDITION: ICD-10-CM

## 2023-04-05 DIAGNOSIS — F41.1 GAD (GENERALIZED ANXIETY DISORDER): ICD-10-CM

## 2023-04-05 LAB
CHOLEST SERPL-MCNC: 212 MG/DL
HBA1C MFR BLD: 5.5 % (ref 0–5.6)
HDLC SERPL-MCNC: 52 MG/DL
LDLC SERPL CALC-MCNC: 145 MG/DL
NONHDLC SERPL-MCNC: 160 MG/DL
TRIGL SERPL-MCNC: 73 MG/DL

## 2023-04-05 PROCEDURE — 80061 LIPID PANEL: CPT | Performed by: PHYSICIAN ASSISTANT

## 2023-04-05 PROCEDURE — 83036 HEMOGLOBIN GLYCOSYLATED A1C: CPT | Performed by: PHYSICIAN ASSISTANT

## 2023-04-05 PROCEDURE — 99395 PREV VISIT EST AGE 18-39: CPT | Performed by: PHYSICIAN ASSISTANT

## 2023-04-05 PROCEDURE — 36415 COLL VENOUS BLD VENIPUNCTURE: CPT | Performed by: PHYSICIAN ASSISTANT

## 2023-04-05 PROCEDURE — 99213 OFFICE O/P EST LOW 20 MIN: CPT | Mod: 25 | Performed by: PHYSICIAN ASSISTANT

## 2023-04-05 RX ORDER — ESCITALOPRAM OXALATE 10 MG/1
10 TABLET ORAL DAILY
Qty: 90 TABLET | Refills: 1 | Status: SHIPPED | OUTPATIENT
Start: 2023-04-05 | End: 2023-10-19

## 2023-04-05 ASSESSMENT — ANXIETY QUESTIONNAIRES
GAD7 TOTAL SCORE: 5
5. BEING SO RESTLESS THAT IT IS HARD TO SIT STILL: NOT AT ALL
4. TROUBLE RELAXING: NOT AT ALL
1. FEELING NERVOUS, ANXIOUS, OR ON EDGE: SEVERAL DAYS
2. NOT BEING ABLE TO STOP OR CONTROL WORRYING: SEVERAL DAYS
6. BECOMING EASILY ANNOYED OR IRRITABLE: SEVERAL DAYS
GAD7 TOTAL SCORE: 5
IF YOU CHECKED OFF ANY PROBLEMS ON THIS QUESTIONNAIRE, HOW DIFFICULT HAVE THESE PROBLEMS MADE IT FOR YOU TO DO YOUR WORK, TAKE CARE OF THINGS AT HOME, OR GET ALONG WITH OTHER PEOPLE: SOMEWHAT DIFFICULT
7. FEELING AFRAID AS IF SOMETHING AWFUL MIGHT HAPPEN: SEVERAL DAYS
3. WORRYING TOO MUCH ABOUT DIFFERENT THINGS: SEVERAL DAYS

## 2023-04-05 ASSESSMENT — ENCOUNTER SYMPTOMS
SHORTNESS OF BREATH: 0
JOINT SWELLING: 0
SORE THROAT: 0
DYSURIA: 0
PALPITATIONS: 0
WEAKNESS: 0
CONSTIPATION: 0
MYALGIAS: 0
FEVER: 0
EYE PAIN: 0
CHILLS: 0
HEMATURIA: 0
ARTHRALGIAS: 0
HEARTBURN: 0
DIARRHEA: 0
DIZZINESS: 0
HEMATOCHEZIA: 0
FREQUENCY: 0
HEADACHES: 0
NERVOUS/ANXIOUS: 0
COUGH: 0
NAUSEA: 0
PARESTHESIAS: 0
ABDOMINAL PAIN: 0

## 2023-04-05 ASSESSMENT — PATIENT HEALTH QUESTIONNAIRE - PHQ9
10. IF YOU CHECKED OFF ANY PROBLEMS, HOW DIFFICULT HAVE THESE PROBLEMS MADE IT FOR YOU TO DO YOUR WORK, TAKE CARE OF THINGS AT HOME, OR GET ALONG WITH OTHER PEOPLE: SOMEWHAT DIFFICULT
SUM OF ALL RESPONSES TO PHQ QUESTIONS 1-9: 2
SUM OF ALL RESPONSES TO PHQ QUESTIONS 1-9: 2

## 2023-04-05 ASSESSMENT — PAIN SCALES - GENERAL: PAINLEVEL: NO PAIN (0)

## 2023-04-05 NOTE — PROGRESS NOTES
SUBJECTIVE:   CC: Harvey is an 37 year old who presents for preventative health visit.        View : No data to display.              Patient has been advised of split billing requirements and indicates understanding: Yes  Healthy Habits:     Getting at least 3 servings of Calcium per day:  Yes    Bi-annual eye exam:  Yes    Dental care twice a year:  Yes    Sleep apnea or symptoms of sleep apnea:  None    Diet:  Regular (no restrictions)    Frequency of exercise:  2-3 days/week    Duration of exercise:  Less than 15 minutes    Taking medications regularly:  Yes    Medication side effects:  None    PHQ-2 Total Score: 0    Additional concerns today:  No    Patient and his partner just had a baby - yay! However, he notes an increase in his anxiety level since baby arrived. Would increasing escitalopram be helpful?    Anxiety Follow-Up    How are you doing with your anxiety since your last visit? Worsened    Are you having other symptoms that might be associated with anxiety? No    Have you had a significant life event? OTHER: new baby     Are you feeling depressed? No    Do you have any concerns with your use of alcohol or other drugs? No    Social History     Tobacco Use     Smoking status: Never     Smokeless tobacco: Never   Substance Use Topics     Alcohol use: Yes     Alcohol/week: 0.0 standard drinks of alcohol     Comment: 1 drink a week     Drug use: No         9/3/2020     5:16 PM 4/13/2022     8:51 AM 4/5/2023    10:11 AM   RADHA-7 SCORE   Total Score 13 11 5         9/3/2020     5:16 PM 4/13/2022     8:51 AM 4/5/2023    10:03 AM   PHQ   PHQ-9 Total Score 15 5 2   Q9: Thoughts of better off dead/self-harm past 2 weeks More than half the days Not at all Not at all           Today's PHQ-2 Score:       4/3/2023     1:34 PM   PHQ-2 ( 1999 Pfizer)   Q1: Little interest or pleasure in doing things 0   Q2: Feeling down, depressed or hopeless 0   PHQ-2 Score 0   Q1: Little interest or pleasure in doing things Not at all    Q2: Feeling down, depressed or hopeless Not at all   PHQ-2 Score 0     Social History     Tobacco Use     Smoking status: Never     Smokeless tobacco: Never   Vaping Use     Vaping status: Not on file   Substance Use Topics     Alcohol use: Yes     Alcohol/week: 0.0 standard drinks of alcohol     Comment: 1 drink a week             4/3/2023     1:34 PM   Alcohol Use   Prescreen: >3 drinks/day or >7 drinks/week? No          View : No data to display.                Last PSA: No results found for: PSA    Reviewed orders with patient. Reviewed health maintenance and updated orders accordingly - Yes  BP Readings from Last 3 Encounters:   04/05/23 118/69   04/13/22 120/64   08/15/18 113/56    Wt Readings from Last 3 Encounters:   04/05/23 70.8 kg (156 lb)   04/13/22 76.7 kg (169 lb)   08/15/18 71.7 kg (158 lb)             Patient Active Problem List   Diagnosis     Superior semicircular canal dehiscence, unspecified laterality     RADHA (generalized anxiety disorder)     Past Surgical History:   Procedure Laterality Date     HEAD & NECK SURGERY  August 2005    Correction of severe underbite     MANDIBLE SURGERY  2005    For underbite       Social History     Tobacco Use     Smoking status: Never     Smokeless tobacco: Never   Vaping Use     Vaping status: Not on file   Substance Use Topics     Alcohol use: Yes     Alcohol/week: 0.0 standard drinks of alcohol     Comment: 1 drink a week     Family History   Problem Relation Age of Onset     Mental Illness Other      Depression Mother      Depression Father            Reviewed and updated as needed this visit by clinical staff   Tobacco  Allergies  Meds  Problems  Med Hx  Surg Hx  Fam Hx          Reviewed and updated as needed this visit by Provider   Tobacco  Allergies  Meds  Problems  Med Hx  Surg Hx  Fam Hx           Review of Systems   Constitutional: Negative for chills and fever.   HENT: Negative for congestion, ear pain, hearing loss and sore throat.   "  Eyes: Negative for pain and visual disturbance.   Respiratory: Negative for cough and shortness of breath.    Cardiovascular: Negative for chest pain, palpitations and peripheral edema.   Gastrointestinal: Negative for abdominal pain, constipation, diarrhea, heartburn, hematochezia and nausea.   Genitourinary: Negative for dysuria, frequency, genital sores, hematuria, impotence, penile discharge and urgency.   Musculoskeletal: Negative for arthralgias, joint swelling and myalgias.   Skin: Negative for rash.   Neurological: Negative for dizziness, weakness, headaches and paresthesias.   Psychiatric/Behavioral: Negative for mood changes. The patient is not nervous/anxious.      OBJECTIVE:   /69   Pulse 72   Temp 96.9  F (36.1  C) (Temporal)   Resp 15   Ht 1.819 m (5' 11.61\")   Wt 70.8 kg (156 lb)   SpO2 98%   BMI 21.39 kg/m      Physical Exam  GENERAL: healthy, alert and no distress  EYES: Eyes grossly normal to inspection, PERRL and conjunctivae and sclerae normal  HENT: ear canals and TM's normal, nose and mouth without ulcers or lesions  NECK: no adenopathy, no asymmetry, masses, or scars and thyroid normal to palpation  RESP: lungs clear to auscultation - no rales, rhonchi or wheezes  CV: regular rate and rhythm, normal S1 S2, no S3 or S4, no murmur, click or rub, no peripheral edema and peripheral pulses strong  ABDOMEN: soft, nontender, no hepatosplenomegaly, no masses and bowel sounds normal  MS: no gross musculoskeletal defects noted, no edema  SKIN: no suspicious lesions or rashes  NEURO: Normal strength and tone, mentation intact and speech normal  PSYCH: mentation appears normal, affect normal/bright        ASSESSMENT/PLAN:       ICD-10-CM    1. Routine general medical examination at a health care facility  Z00.00       2. RADHA (generalized anxiety disorder)  F41.1 escitalopram (LEXAPRO) 10 MG tablet      3. Screening for cardiovascular condition  Z13.6 Lipid panel reflex to direct LDL Fasting "     Lipid panel reflex to direct LDL Fasting      4. Screening for diabetes mellitus  Z13.1 Hemoglobin A1c     Hemoglobin A1c        - Increase escitalopram to 10 mg every day. Reviewed potential SE, notify me of anything severe. May take 4-6 weeks to see full effect of medication.      COUNSELING:   Reviewed preventive health counseling, as reflected in patient instructions       Immunizations    Declined: Covid-19 due to Concerns about side effects/safety (defers to later date)            He reports that he has never smoked. He has never used smokeless tobacco.        Awilda Hoffman PA-C  [facility]  Answers for HPI/ROS submitted by the patient on 4/5/2023  If you checked off any problems, how difficult have these problems made it for you to do your work, take care of things at home, or get along with other people?: Somewhat difficult  PHQ9 TOTAL SCORE: 2

## 2024-03-06 ENCOUNTER — PATIENT OUTREACH (OUTPATIENT)
Dept: CARE COORDINATION | Facility: CLINIC | Age: 39
End: 2024-03-06
Payer: COMMERCIAL

## 2024-03-20 ENCOUNTER — PATIENT OUTREACH (OUTPATIENT)
Dept: CARE COORDINATION | Facility: CLINIC | Age: 39
End: 2024-03-20
Payer: COMMERCIAL

## 2024-04-23 DIAGNOSIS — F41.1 GAD (GENERALIZED ANXIETY DISORDER): ICD-10-CM

## 2024-04-23 RX ORDER — ESCITALOPRAM OXALATE 10 MG/1
10 TABLET ORAL DAILY
Qty: 60 TABLET | Refills: 0 | Status: SHIPPED | OUTPATIENT
Start: 2024-04-23 | End: 2024-06-21

## 2024-06-23 ENCOUNTER — HEALTH MAINTENANCE LETTER (OUTPATIENT)
Age: 39
End: 2024-06-23

## 2024-09-03 ENCOUNTER — LAB REQUISITION (OUTPATIENT)
Dept: LAB | Facility: CLINIC | Age: 39
End: 2024-09-03

## 2024-09-03 PROCEDURE — 87635 SARS-COV-2 COVID-19 AMP PRB: CPT | Performed by: INTERNAL MEDICINE

## 2024-09-04 LAB — SARS-COV-2 RNA RESP QL NAA+PROBE: NEGATIVE

## 2024-09-19 DIAGNOSIS — F41.1 GAD (GENERALIZED ANXIETY DISORDER): ICD-10-CM

## 2024-09-20 RX ORDER — ESCITALOPRAM OXALATE 10 MG/1
10 TABLET ORAL DAILY
Qty: 90 TABLET | Refills: 0 | Status: SHIPPED | OUTPATIENT
Start: 2024-09-20

## 2024-10-02 ENCOUNTER — PATIENT OUTREACH (OUTPATIENT)
Dept: CARE COORDINATION | Facility: CLINIC | Age: 39
End: 2024-10-02
Payer: COMMERCIAL

## 2024-11-17 SDOH — HEALTH STABILITY: PHYSICAL HEALTH: ON AVERAGE, HOW MANY DAYS PER WEEK DO YOU ENGAGE IN MODERATE TO STRENUOUS EXERCISE (LIKE A BRISK WALK)?: 1 DAY

## 2024-11-17 SDOH — HEALTH STABILITY: PHYSICAL HEALTH: ON AVERAGE, HOW MANY MINUTES DO YOU ENGAGE IN EXERCISE AT THIS LEVEL?: 20 MIN

## 2024-11-17 ASSESSMENT — SOCIAL DETERMINANTS OF HEALTH (SDOH): HOW OFTEN DO YOU GET TOGETHER WITH FRIENDS OR RELATIVES?: ONCE A WEEK

## 2025-01-20 SDOH — HEALTH STABILITY: PHYSICAL HEALTH: ON AVERAGE, HOW MANY MINUTES DO YOU ENGAGE IN EXERCISE AT THIS LEVEL?: 20 MIN

## 2025-01-20 SDOH — HEALTH STABILITY: PHYSICAL HEALTH: ON AVERAGE, HOW MANY DAYS PER WEEK DO YOU ENGAGE IN MODERATE TO STRENUOUS EXERCISE (LIKE A BRISK WALK)?: 3 DAYS

## 2025-01-20 ASSESSMENT — SOCIAL DETERMINANTS OF HEALTH (SDOH): HOW OFTEN DO YOU GET TOGETHER WITH FRIENDS OR RELATIVES?: ONCE A WEEK

## 2025-01-22 ENCOUNTER — OFFICE VISIT (OUTPATIENT)
Dept: FAMILY MEDICINE | Facility: CLINIC | Age: 40
End: 2025-01-22
Payer: COMMERCIAL

## 2025-01-22 VITALS
TEMPERATURE: 97.7 F | WEIGHT: 158.4 LBS | OXYGEN SATURATION: 99 % | HEIGHT: 72 IN | SYSTOLIC BLOOD PRESSURE: 104 MMHG | BODY MASS INDEX: 21.45 KG/M2 | DIASTOLIC BLOOD PRESSURE: 64 MMHG | HEART RATE: 78 BPM | RESPIRATION RATE: 13 BRPM

## 2025-01-22 DIAGNOSIS — Z23 NEED FOR VACCINATION: ICD-10-CM

## 2025-01-22 DIAGNOSIS — Z00.00 ROUTINE GENERAL MEDICAL EXAMINATION AT A HEALTH CARE FACILITY: Primary | ICD-10-CM

## 2025-01-22 DIAGNOSIS — H83.8X3 SUPERIOR SEMICIRCULAR CANAL DEHISCENCE OF BOTH EARS: ICD-10-CM

## 2025-01-22 DIAGNOSIS — Z13.6 SCREENING FOR CARDIOVASCULAR CONDITION: ICD-10-CM

## 2025-01-22 DIAGNOSIS — Z13.1 SCREENING FOR DIABETES MELLITUS: ICD-10-CM

## 2025-01-22 DIAGNOSIS — F41.1 GAD (GENERALIZED ANXIETY DISORDER): ICD-10-CM

## 2025-01-22 LAB
ERYTHROCYTE [DISTWIDTH] IN BLOOD BY AUTOMATED COUNT: 11.5 % (ref 10–15)
HCT VFR BLD AUTO: 40.6 % (ref 40–53)
HGB BLD-MCNC: 14.3 G/DL (ref 13.3–17.7)
MCH RBC QN AUTO: 32 PG (ref 26.5–33)
MCHC RBC AUTO-ENTMCNC: 35.2 G/DL (ref 31.5–36.5)
MCV RBC AUTO: 91 FL (ref 78–100)
PLATELET # BLD AUTO: 212 10E3/UL (ref 150–450)
RBC # BLD AUTO: 4.47 10E6/UL (ref 4.4–5.9)
WBC # BLD AUTO: 3.7 10E3/UL (ref 4–11)

## 2025-01-22 PROCEDURE — 80061 LIPID PANEL: CPT | Performed by: PHYSICIAN ASSISTANT

## 2025-01-22 PROCEDURE — 36415 COLL VENOUS BLD VENIPUNCTURE: CPT | Performed by: PHYSICIAN ASSISTANT

## 2025-01-22 PROCEDURE — 99213 OFFICE O/P EST LOW 20 MIN: CPT | Mod: 25 | Performed by: PHYSICIAN ASSISTANT

## 2025-01-22 PROCEDURE — 90715 TDAP VACCINE 7 YRS/> IM: CPT | Performed by: PHYSICIAN ASSISTANT

## 2025-01-22 PROCEDURE — 99395 PREV VISIT EST AGE 18-39: CPT | Mod: 25 | Performed by: PHYSICIAN ASSISTANT

## 2025-01-22 PROCEDURE — 85027 COMPLETE CBC AUTOMATED: CPT | Performed by: PHYSICIAN ASSISTANT

## 2025-01-22 PROCEDURE — 90471 IMMUNIZATION ADMIN: CPT | Performed by: PHYSICIAN ASSISTANT

## 2025-01-22 PROCEDURE — 96127 BRIEF EMOTIONAL/BEHAV ASSMT: CPT | Performed by: PHYSICIAN ASSISTANT

## 2025-01-22 PROCEDURE — 80053 COMPREHEN METABOLIC PANEL: CPT | Performed by: PHYSICIAN ASSISTANT

## 2025-01-22 RX ORDER — ESCITALOPRAM OXALATE 10 MG/1
10 TABLET ORAL DAILY
Qty: 90 TABLET | Refills: 2 | Status: SHIPPED | OUTPATIENT
Start: 2025-01-22

## 2025-01-22 ASSESSMENT — PAIN SCALES - GENERAL: PAINLEVEL_OUTOF10: NO PAIN (0)

## 2025-01-22 ASSESSMENT — ANXIETY QUESTIONNAIRES
4. TROUBLE RELAXING: SEVERAL DAYS
GAD7 TOTAL SCORE: 6
3. WORRYING TOO MUCH ABOUT DIFFERENT THINGS: MORE THAN HALF THE DAYS
7. FEELING AFRAID AS IF SOMETHING AWFUL MIGHT HAPPEN: SEVERAL DAYS
IF YOU CHECKED OFF ANY PROBLEMS ON THIS QUESTIONNAIRE, HOW DIFFICULT HAVE THESE PROBLEMS MADE IT FOR YOU TO DO YOUR WORK, TAKE CARE OF THINGS AT HOME, OR GET ALONG WITH OTHER PEOPLE: NOT DIFFICULT AT ALL
5. BEING SO RESTLESS THAT IT IS HARD TO SIT STILL: NOT AT ALL
GAD7 TOTAL SCORE: 6
2. NOT BEING ABLE TO STOP OR CONTROL WORRYING: NOT AT ALL
1. FEELING NERVOUS, ANXIOUS, OR ON EDGE: SEVERAL DAYS
6. BECOMING EASILY ANNOYED OR IRRITABLE: SEVERAL DAYS
8. IF YOU CHECKED OFF ANY PROBLEMS, HOW DIFFICULT HAVE THESE MADE IT FOR YOU TO DO YOUR WORK, TAKE CARE OF THINGS AT HOME, OR GET ALONG WITH OTHER PEOPLE?: NOT DIFFICULT AT ALL
7. FEELING AFRAID AS IF SOMETHING AWFUL MIGHT HAPPEN: SEVERAL DAYS
GAD7 TOTAL SCORE: 6

## 2025-01-22 NOTE — PROGRESS NOTES
"Preventive Care Visit  [facility]  Awilda Hoffman PA-C, Internal Medicine  Jan 22, 2025      Assessment & Plan       ICD-10-CM    1. Routine general medical examination at a health care facility  Z00.00 CBC with platelets      2. 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 RADHA (generalized anxiety disorder)  F41.1 escitalopram (LEXAPRO) 10 MG tablet      4. Screening for cardiovascular condition  Z13.6 Lipid panel reflex to direct LDL Non-fasting      5. Screening for diabetes mellitus  Z13.1 Comprehensive metabolic panel      6. Need for vaccination  Z23 TDAP 7+ (ADACEL,BOOSTRIX)        Unclear etiology of \"chirping\" noise. May be progression of chronic tinnitus but unknown if relate to abnormal inner ear structures. No evidence of central etiology on exam. Referral placed to ENT for further evaluation and management.    RADHA well managed on current regimen, continue without change. Refils as above.    The longitudinal plan of care for the diagnosis(es)/condition(s) as documented were addressed during this visit. Due to the added complexity in care, I will continue to support Harvey in the subsequent management and with ongoing continuity of care.      Counseling  Appropriate preventive services were addressed with this patient via screening, questionnaire, or discussion as appropriate for fall prevention, nutrition, physical activity, Tobacco-use cessation, social engagement, weight loss and cognition.  Checklist reviewing preventive services available has been given to the patient.  Reviewed patient's diet, addressing concerns and/or questions.   He is at risk for lack of exercise and has been provided with information to increase physical activity for the benefit of his well-being.           Subjective   Harvey is a 39 year old, presenting for the following:  Physical (Fasting. ) and Ear Problem (Ringing in both ears happening more " "frequently )        1/22/2025    10:12 AM   Additional Questions   Roomed by Nola EASTMAN          HPI    History chronic tinnitus bilat 2/2 playing in a band in high school. For past several months, he notes a \"metallic chirping\" sound in bilat ears which has been occurring more frequently (every 1-3 minutes). No change in hearing, no pain or discomfort. Of note, seen in 2016 for peripheral vertigo and found to have bilat hehiscence of semicircular canals.    Anxiety   How are you doing with your anxiety since your last visit? stable  Are you having other symptoms that might be associated with anxiety? No  Have you had a significant life event? OTHER: recent car problems causing increased stress    Are you feeling depressed? No  Do you have any concerns with your use of alcohol or other drugs? No    Social History     Tobacco Use    Smoking status: Never    Smokeless tobacco: Never   Vaping Use    Vaping status: Never Used   Substance Use Topics    Alcohol use: Yes     Comment: Weekends    Drug use: No         4/13/2022     8:51 AM 4/5/2023    10:11 AM 1/22/2025    10:04 AM   RADHA-7 SCORE   Total Score   6 (mild anxiety)   Total Score 11 5 6        Patient-reported         9/3/2020     5:16 PM 4/13/2022     8:51 AM 4/5/2023    10:03 AM   PHQ   PHQ-9 Total Score 15 5 2   Q9: Thoughts of better off dead/self-harm past 2 weeks More than half the days Not at all Not at all        Proxy-reported         Health Care Directive  Patient does not have a Health Care Directive: Discussed advance care planning with patient; information given to patient to review.      1/20/2025   General Health   How would you rate your overall physical health? Good   Feel stress (tense, anxious, or unable to sleep) Only a little   (!) STRESS CONCERN      1/20/2025   Nutrition   Three or more servings of calcium each day? Yes   Diet: Regular (no restrictions)   How many servings of fruit and vegetables per day? (!) 2-3   How many sweetened " beverages each day? 0-1         1/20/2025   Exercise   Days per week of moderate/strenous exercise 3 days   Average minutes spent exercising at this level 20 min         1/20/2025   Social Factors   Frequency of gathering with friends or relatives Once a week   Worry food won't last until get money to buy more No   Food not last or not have enough money for food? No   Do you have housing? (Housing is defined as stable permanent housing and does not include staying ouside in a car, in a tent, in an abandoned building, in an overnight shelter, or couch-surfing.) No   Are you worried about losing your housing? No   Lack of transportation? No   Unable to get utilities (heat,electricity)? No   Want help with housing or utility concern? No   (!) HOUSING CONCERN PRESENT      1/20/2025   Dental   Dentist two times every year? Yes         11/17/2024   TB Screening   Were you born outside of the [place]? No         Today's PHQ-2 Score:       1/22/2025    10:05 AM   PHQ-2 ( 1999 Pfizer)   Q1: Little interest or pleasure in doing things 0   Q2: Feeling down, depressed or hopeless 0   PHQ-2 Score 0    Q1: Little interest or pleasure in doing things Not at all   Q2: Feeling down, depressed or hopeless Not at all   PHQ-2 Score 0       Patient-reported           1/20/2025   Substance Use   Alcohol more than 3/day or more than 7/wk No   Do you use any other substances recreationally? (!) CANNABIS PRODUCTS     Social History     Tobacco Use    Smoking status: Never    Smokeless tobacco: Never   Vaping Use    Vaping status: Never Used   Substance Use Topics    Alcohol use: Yes     Comment: Weekends    Drug use: No           1/20/2025   STI Screening   New sexual partner(s) since last STI/HIV test? No         1/20/2025   Contraception/Family Planning   Questions about contraception or family planning No        Reviewed and updated as needed this visit by Provider   Tobacco  Allergies  Meds  Problems  Med Hx  Surg Hx  Fam Hx         "          Review of Systems  Constitutional, HEENT, cardiovascular, pulmonary, GI, , musculoskeletal, neuro, skin, endocrine and psych systems are negative, except as otherwise noted.     Objective    Exam  /64   Pulse 78   Temp 97.7  F (36.5  C) (Temporal)   Resp 13   Ht 1.823 m (5' 11.77\")   Wt 71.8 kg (158 lb 6.4 oz)   SpO2 99%   BMI 21.62 kg/m     Estimated body mass index is 21.62 kg/m  as calculated from the following:    Height as of this encounter: 1.823 m (5' 11.77\").    Weight as of this encounter: 71.8 kg (158 lb 6.4 oz).    Physical Exam  HENT:      Head: Normocephalic and atraumatic.      Right Ear: Tympanic membrane, ear canal and external ear normal. No decreased hearing noted. No mastoid tenderness.      Left Ear: Tympanic membrane, ear canal and external ear normal. No decreased hearing noted. No mastoid tenderness.      Mouth/Throat:      Mouth: Mucous membranes are moist. No oral lesions.      Tongue: Tongue does not deviate from midline.      Pharynx: Oropharynx is clear. Uvula midline.   Neck:      Vascular: No carotid bruit.   Cardiovascular:      Rate and Rhythm: Normal rate and regular rhythm.      Heart sounds: S1 normal and S2 normal. No murmur heard.     No friction rub. No gallop.   Pulmonary:      Effort: Pulmonary effort is normal.      Breath sounds: Normal breath sounds. No wheezing, rhonchi or rales.   Abdominal:      General: Abdomen is flat. Bowel sounds are normal. There is no distension.      Palpations: Abdomen is soft.      Tenderness: There is no abdominal tenderness.   Musculoskeletal:      Cervical back: Neck supple.      Right lower leg: No edema.      Left lower leg: No edema.   Lymphadenopathy:      Cervical: No cervical adenopathy.   Skin:     General: Skin is warm and dry.   Neurological:      Mental Status: He is alert.      Cranial Nerves: Cranial nerves 2-12 are intact.      Sensory: Sensation is intact.      Coordination: Coordination is intact.      " Deep Tendon Reflexes: Reflexes are normal and symmetric.   Psychiatric:         Mood and Affect: Mood and affect normal.         Speech: Speech normal.         Behavior: Behavior is cooperative.         Thought Content: Thought content normal.         Judgment: Judgment normal.               Signed Electronically by: Awilda Hoffman PA-C    Answers submitted by the patient for this visit:  Patient Health Questionnaire (G7) (Submitted on 1/22/2025)  RADHA 7 TOTAL SCORE: 6

## 2025-01-22 NOTE — PATIENT INSTRUCTIONS
Patient Education   Preventive Care Advice   This is general advice given by our system to help you stay healthy. However, your care team may have specific advice just for you. Please talk to your care team about your preventive care needs.  Nutrition  Eat 5 or more servings of fruits and vegetables each day.  Try wheat bread, brown rice and whole grain pasta (instead of white bread, rice, and pasta).  Get enough calcium and vitamin D. Check the label on foods and aim for 100% of the RDA (recommended daily allowance).  Lifestyle  Exercise at least 150 minutes each week  (30 minutes a day, 5 days a week).  Do muscle strengthening activities 2 days a week. These help control your weight and prevent disease.  No smoking.  Wear sunscreen to prevent skin cancer.  Have a dental exam and cleaning every 6 months.  Yearly exams  See your health care team every year to talk about:  Any changes in your health.  Any medicines your care team has prescribed.  Preventive care, family planning, and ways to prevent chronic diseases.  Shots (vaccines)   HPV shots (up to age 26), if you've never had them before.  Hepatitis B shots (up to age 59), if you've never had them before.  COVID-19 shot: Get this shot when it's due.  Flu shot: Get a flu shot every year.  Tetanus shot: Get a tetanus shot every 10 years.  Pneumococcal, hepatitis A, and RSV shots: Ask your care team if you need these based on your risk.  Shingles shot (for age 50 and up)  General health tests  Diabetes screening:  Starting at age 35, Get screened for diabetes at least every 3 years.  If you are younger than age 35, ask your care team if you should be screened for diabetes.  Cholesterol test: At age 39, start having a cholesterol test every 5 years, or more often if advised.  Bone density scan (DEXA): At age 50, ask your care team if you should have this scan for osteoporosis (brittle bones).  Hepatitis C: Get tested at least once in your life.  STIs (sexually  transmitted infections)  Before age 24: Ask your care team if you should be screened for STIs.  After age 24: Get screened for STIs if you're at risk. You are at risk for STIs (including HIV) if:  You are sexually active with more than one person.  You don't use condoms every time.  You or a partner was diagnosed with a sexually transmitted infection.  If you are at risk for HIV, ask about PrEP medicine to prevent HIV.  Get tested for HIV at least once in your life, whether you are at risk for HIV or not.  Cancer screening tests  Cervical cancer screening: If you have a cervix, begin getting regular cervical cancer screening tests starting at age 21.  Breast cancer scan (mammogram): If you've ever had breasts, begin having regular mammograms starting at age 40. This is a scan to check for breast cancer.  Colon cancer screening: It is important to start screening for colon cancer at age 45.  Have a colonoscopy test every 10 years (or more often if you're at risk) Or, ask your provider about stool tests like a FIT test every year or Cologuard test every 3 years.  To learn more about your testing options, visit:   .  For help making a decision, visit:   https://bit.ly/hq29516.  Prostate cancer screening test: If you have a prostate, ask your care team if a prostate cancer screening test (PSA) at age 55 is right for you.  Lung cancer screening: If you are a current or former smoker ages 50 to 80, ask your care team if ongoing lung cancer screenings are right for you.  For informational purposes only. Not to replace the advice of your health care provider. Copyright   2023 [employer]. All rights reserved. Clinically reviewed by the [facility]. [employer] [place] 259367 - REV 01/24.

## 2025-01-23 LAB
ALBUMIN SERPL BCG-MCNC: 4.6 G/DL (ref 3.5–5.2)
ALP SERPL-CCNC: 59 U/L (ref 40–150)
ALT SERPL W P-5'-P-CCNC: 23 U/L (ref 0–70)
ANION GAP SERPL CALCULATED.3IONS-SCNC: 10 MMOL/L (ref 7–15)
AST SERPL W P-5'-P-CCNC: 27 U/L (ref 0–45)
BILIRUB SERPL-MCNC: 0.5 MG/DL
BUN SERPL-MCNC: 16.2 MG/DL (ref 6–20)
CALCIUM SERPL-MCNC: 9.3 MG/DL (ref 8.8–10.4)
CHLORIDE SERPL-SCNC: 102 MMOL/L (ref 98–107)
CHOLEST SERPL-MCNC: 224 MG/DL
CREAT SERPL-MCNC: 0.9 MG/DL (ref 0.67–1.17)
EGFRCR SERPLBLD CKD-EPI 2021: >90 ML/MIN/1.73M2
FASTING STATUS PATIENT QL REPORTED: YES
FASTING STATUS PATIENT QL REPORTED: YES
GLUCOSE SERPL-MCNC: 88 MG/DL (ref 70–99)
HCO3 SERPL-SCNC: 28 MMOL/L (ref 22–29)
HDLC SERPL-MCNC: 53 MG/DL
LDLC SERPL CALC-MCNC: 158 MG/DL
NONHDLC SERPL-MCNC: 171 MG/DL
POTASSIUM SERPL-SCNC: 3.7 MMOL/L (ref 3.4–5.3)
PROT SERPL-MCNC: 7.7 G/DL (ref 6.4–8.3)
SODIUM SERPL-SCNC: 140 MMOL/L (ref 135–145)
TRIGL SERPL-MCNC: 63 MG/DL

## 2025-03-27 ENCOUNTER — OFFICE VISIT (OUTPATIENT)
Dept: OTOLARYNGOLOGY | Facility: CLINIC | Age: 40
End: 2025-03-27
Payer: COMMERCIAL

## 2025-03-27 ENCOUNTER — OFFICE VISIT (OUTPATIENT)
Dept: AUDIOLOGY | Facility: CLINIC | Age: 40
End: 2025-03-27
Payer: COMMERCIAL

## 2025-03-27 VITALS
DIASTOLIC BLOOD PRESSURE: 70 MMHG | SYSTOLIC BLOOD PRESSURE: 113 MMHG | WEIGHT: 158.5 LBS | HEART RATE: 78 BPM | OXYGEN SATURATION: 98 % | HEIGHT: 72 IN | BODY MASS INDEX: 21.47 KG/M2

## 2025-03-27 DIAGNOSIS — H93.13 TINNITUS OF BOTH EARS: Primary | ICD-10-CM

## 2025-03-27 DIAGNOSIS — H83.8X3 SUPERIOR SEMICIRCULAR CANAL DEHISCENCE OF BOTH EARS: ICD-10-CM

## 2025-03-27 DIAGNOSIS — H93.13 TINNITUS, BILATERAL: Primary | ICD-10-CM

## 2025-03-27 PROCEDURE — 3074F SYST BP LT 130 MM HG: CPT | Performed by: REGISTERED NURSE

## 2025-03-27 PROCEDURE — 92504 EAR MICROSCOPY EXAMINATION: CPT | Performed by: REGISTERED NURSE

## 2025-03-27 PROCEDURE — 1126F AMNT PAIN NOTED NONE PRSNT: CPT | Performed by: REGISTERED NURSE

## 2025-03-27 PROCEDURE — 99202 OFFICE O/P NEW SF 15 MIN: CPT | Mod: 25 | Performed by: REGISTERED NURSE

## 2025-03-27 PROCEDURE — 3078F DIAST BP <80 MM HG: CPT | Performed by: REGISTERED NURSE

## 2025-03-27 ASSESSMENT — PAIN SCALES - GENERAL: PAINLEVEL_OUTOF10: NO PAIN (0)

## 2025-03-27 NOTE — PROGRESS NOTES
AUDIOLOGY REPORT    SUMMARY: Audiology visit completed. See audiogram for results.      RECOMMENDATIONS: Follow-up with ENT.    Emi Bhatt, [facility]  Licensed Audiologist  [place] #[id number]

## 2025-03-27 NOTE — LETTER
3/27/2025       RE: Harvey Pro  [address]  [place] [address]     Dear Colleague,    Thank you for referring your patient, Harvey Pro, to the [facility] [place] at [facility]. Please see a copy of my visit note below.      Otolaryngology Clinic  March 27, 2025    Chief Complaint:   Bilateral tinnitus       History of Present Illness:   Harvey Pro is a 39 year old male who presents today for evaluation of new tinnitus symptoms.  Patient has a history of bilateral seminal circular canal dehiscence.  Patient is managing vertigo symptoms due to SICD at this time.  Patient reports stable hearing.    Today, patient presents to clinic for evaluation of new tinnitus symptoms.  Patient has longstanding bilateral constant tonal tinnitus, however, reports new symptoms of 'clicking' in each ear that happens once every few minutes lasting for a few seconds. Can happen in either ear. Denies any pulsating tinnitus. This is not associated with noise exposure or movement. Can be bothersome but denies any change in hearing. No dizziness when noise is happening.     Past Medical History:  Past Medical History:   Diagnosis Date     Superior semicircular canal dehiscence      Tinea versicolor        Past Surgical History:  Past Surgical History:   Procedure Laterality Date     HEAD & NECK SURGERY  August 2005    Correction of severe underbite     MANDIBLE SURGERY  2005    For underbite       Medications:  Current Outpatient Medications   Medication Sig Dispense Refill     escitalopram (LEXAPRO) 10 MG tablet Take 1 tablet (10 mg) by mouth daily. 90 tablet 2     fluticasone (FLONASE) 50 MCG/ACT nasal spray Spray 1 spray into both nostrils daily         Allergies:  Allergies   Allergen Reactions     No Known Allergies         Social History:  Social History     Tobacco Use     Smoking status: Never     Smokeless tobacco: Never  "  Vaping Use     Vaping status: Never Used   Substance Use Topics     Alcohol use: Yes     Comment: Weekends     Drug use: No       ROS: 10 point ROS neg other than the symptoms noted above in the HPI.    Physical Exam:    /70   Pulse 78   Ht 1.823 m (5' 11.77\")   Wt 71.9 kg (158 lb 8 oz)   SpO2 98%   BMI 21.63 kg/m       Constitutional:  The patient was unaccompanied, well-groomed, and in no acute distress.     Skin: Normal:  warm and pink without rash    Neurologic: Alert and oriented x 3.  CN's III-XII within normal limits.  Voice normal.    Psychiatric: The patient's affect was calm, cooperative, and appropriate.     Communication:  Normal; communicates verbally, normal voice quality.    Respiratory: Breathing comfortably without stridor or exertion of accessory muscles.    Ears: Pinnae and tragus non-tender.  EAC's and TM's were clear.        Otologic microscope exam:    Right ear was examined under the microscope.  Normal appearing TM, nicely aerated middle ear space.    Left ear was also examined under the microscope.  Normal appearing TM, nicely aerated middle ear space.       Audiogram: 3/27/2025 - data independently reviewed  Normal hearing bilaterally (essentially stable compared to 2/18/2016 audiogram)  % at 50 dB bilaterally  Acoustic Reflexes: Present in all conditions  Tympanograms: type A bilaterally    Assessment and Plan:  Patient with history of bilateral semicircular canal dehiscence with intermittent new symptoms.  Per patient report, this does not seem to be consistent with a middle ear myoclonus.  I do wonder if it could be due to muscle spasms surrounding the ear including muscles of the jaw joint.  Audiogram is stable.    Recommend a temporal bone CT to further evaluate middle ear and bilateral dehiscence.  Patient will schedule imaging at their convenience.  I will review imaging with my neurotology colleagues and contact patient with results and any further " recommendations.     Jenny Saldivar DNP, APRN, CNP  Otolaryngology  Head & Neck Surgery  [phone]    30 minutes spent by me on the date of the encounter doing chart review, history and exam, documentation and further activities per the note      Again, thank you for allowing me to participate in the care of your patient.      Sincerely,    Ayla Saldivar, NP

## 2025-03-30 NOTE — PROGRESS NOTES
"  Otolaryngology Clinic  March 27, 2025    Chief Complaint:   Bilateral tinnitus       History of Present Illness:   Harvey Pro is a 39 year old male who presents today for evaluation of new tinnitus symptoms.  Patient has a history of bilateral seminal circular canal dehiscence.  Patient is managing vertigo symptoms due to SICD at this time.  Patient reports stable hearing.    Today, patient presents to clinic for evaluation of new tinnitus symptoms.  Patient has longstanding bilateral constant tonal tinnitus, however, reports new symptoms of 'clicking' in each ear that happens once every few minutes lasting for a few seconds. Can happen in either ear. Denies any pulsating tinnitus. This is not associated with noise exposure or movement. Can be bothersome but denies any change in hearing. No dizziness when noise is happening.     Past Medical History:  Past Medical History:   Diagnosis Date    Superior semicircular canal dehiscence     Tinea versicolor        Past Surgical History:  Past Surgical History:   Procedure Laterality Date    HEAD & NECK SURGERY  August 2005    Correction of severe underbite    MANDIBLE SURGERY  2005    For underbite       Medications:  Current Outpatient Medications   Medication Sig Dispense Refill    escitalopram (LEXAPRO) 10 MG tablet Take 1 tablet (10 mg) by mouth daily. 90 tablet 2    fluticasone (FLONASE) 50 MCG/ACT nasal spray Spray 1 spray into both nostrils daily         Allergies:  Allergies   Allergen Reactions    No Known Allergies         Social History:  Social History     Tobacco Use    Smoking status: Never    Smokeless tobacco: Never   Vaping Use    Vaping status: Never Used   Substance Use Topics    Alcohol use: Yes     Comment: Weekends    Drug use: No       ROS: 10 point ROS neg other than the symptoms noted above in the HPI.    Physical Exam:    /70   Pulse 78   Ht 1.823 m (5' 11.77\")   Wt 71.9 kg (158 lb 8 oz)   SpO2 98%   BMI 21.63 kg/m   "     Constitutional:  The patient was unaccompanied, well-groomed, and in no acute distress.     Skin: Normal:  warm and pink without rash    Neurologic: Alert and oriented x 3.  CN's III-XII within normal limits.  Voice normal.    Psychiatric: The patient's affect was calm, cooperative, and appropriate.     Communication:  Normal; communicates verbally, normal voice quality.    Respiratory: Breathing comfortably without stridor or exertion of accessory muscles.    Ears: Pinnae and tragus non-tender.  EAC's and TM's were clear.        Otologic microscope exam:    Right ear was examined under the microscope.  Normal appearing TM, nicely aerated middle ear space.    Left ear was also examined under the microscope.  Normal appearing TM, nicely aerated middle ear space.       Audiogram: 3/27/2025 - data independently reviewed  Normal hearing bilaterally (essentially stable compared to 2/18/2016 audiogram)  % at 50 dB bilaterally  Acoustic Reflexes: Present in all conditions  Tympanograms: type A bilaterally    Assessment and Plan:  Patient with history of bilateral semicircular canal dehiscence with intermittent new symptoms.  Per patient report, this does not seem to be consistent with a middle ear myoclonus.  I do wonder if it could be due to muscle spasms surrounding the ear including muscles of the jaw joint.  Audiogram is stable.    Recommend a temporal bone CT to further evaluate middle ear and bilateral dehiscence.  Patient will schedule imaging at their convenience.  I will review imaging with my neurotology colleagues and contact patient with results and any further recommendations.     Jenny Saldivar DNP, APRN, CNP  Otolaryngology  Head & Neck Surgery  [phone]    30 minutes spent by me on the date of the encounter doing chart review, history and exam, documentation and further activities per the note

## 2025-06-18 ENCOUNTER — ANCILLARY PROCEDURE (OUTPATIENT)
Dept: CT IMAGING | Facility: CLINIC | Age: 40
End: 2025-06-18
Attending: REGISTERED NURSE
Payer: COMMERCIAL

## 2025-06-18 DIAGNOSIS — H83.8X3 SUPERIOR SEMICIRCULAR CANAL DEHISCENCE OF BOTH EARS: ICD-10-CM

## 2025-06-18 DIAGNOSIS — H93.13 TINNITUS, BILATERAL: ICD-10-CM

## 2025-06-18 PROCEDURE — 70480 CT ORBIT/EAR/FOSSA W/O DYE: CPT | Performed by: RADIOLOGY

## 2025-06-23 ENCOUNTER — RESULTS FOLLOW-UP (OUTPATIENT)
Dept: OTOLARYNGOLOGY | Facility: CLINIC | Age: 40
End: 2025-06-23